# Patient Record
Sex: FEMALE | Race: WHITE
[De-identification: names, ages, dates, MRNs, and addresses within clinical notes are randomized per-mention and may not be internally consistent; named-entity substitution may affect disease eponyms.]

---

## 2019-06-13 ENCOUNTER — HOSPITAL ENCOUNTER (INPATIENT)
Dept: HOSPITAL 33 - ED | Age: 74
LOS: 5 days | Discharge: NURSING, REHAB OR LONG-TERM CARE | DRG: 389 | End: 2019-06-18
Attending: FAMILY MEDICINE | Admitting: FAMILY MEDICINE
Payer: MEDICARE

## 2019-06-13 DIAGNOSIS — R10.33: ICD-10-CM

## 2019-06-13 DIAGNOSIS — Z79.899: ICD-10-CM

## 2019-06-13 DIAGNOSIS — E78.00: ICD-10-CM

## 2019-06-13 DIAGNOSIS — R42: ICD-10-CM

## 2019-06-13 DIAGNOSIS — R00.1: ICD-10-CM

## 2019-06-13 DIAGNOSIS — R09.02: ICD-10-CM

## 2019-06-13 DIAGNOSIS — I10: ICD-10-CM

## 2019-06-13 DIAGNOSIS — K59.00: ICD-10-CM

## 2019-06-13 DIAGNOSIS — R73.9: ICD-10-CM

## 2019-06-13 DIAGNOSIS — R53.83: ICD-10-CM

## 2019-06-13 DIAGNOSIS — K21.9: ICD-10-CM

## 2019-06-13 DIAGNOSIS — R32: ICD-10-CM

## 2019-06-13 DIAGNOSIS — K57.30: ICD-10-CM

## 2019-06-13 DIAGNOSIS — K43.6: ICD-10-CM

## 2019-06-13 DIAGNOSIS — K56.609: Primary | ICD-10-CM

## 2019-06-13 LAB
ALBUMIN SERPL-MCNC: 4.2 G/DL (ref 3.5–5)
ALP SERPL-CCNC: 87 U/L (ref 38–126)
ALT SERPL-CCNC: 49 U/L (ref 0–35)
ANION GAP SERPL CALC-SCNC: 14.4 MEQ/L (ref 5–15)
AST SERPL QL: 32 U/L (ref 14–36)
BASOPHILS # BLD AUTO: 0.02 10*3/UL (ref 0–0.4)
BASOPHILS NFR BLD AUTO: 0.2 % (ref 0–0.4)
BILIRUB BLD-MCNC: 0.5 MG/DL (ref 0.2–1.3)
BUN SERPL-MCNC: 21 MG/DL (ref 7–17)
CALCIUM SPEC-MCNC: 10.3 MG/DL (ref 8.4–10.2)
CHLORIDE SERPL-SCNC: 104 MMOL/L (ref 98–107)
CO2 SERPL-SCNC: 27 MMOL/L (ref 22–30)
CREAT SERPL-MCNC: 1.57 MG/DL (ref 0.52–1.04)
EOSINOPHIL # BLD AUTO: 0.03 10*3/UL (ref 0–0.5)
GLUCOSE SERPL-MCNC: 154 MG/DL (ref 74–106)
GRANULOCYTES # BLD AUTO: 6.91 10*3/UL (ref 1.4–6.9)
HCT VFR BLD AUTO: 45.5 % (ref 35–47)
HGB BLD-MCNC: 15.2 GM/DL (ref 12–16)
LYMPHOCYTES # SPEC AUTO: 0.89 10*3/UL (ref 1–4.6)
MCH RBC QN AUTO: 30 PG (ref 26–32)
MCHC RBC AUTO-ENTMCNC: 33.4 G/DL (ref 32–36)
MONOCYTES # BLD AUTO: 0.44 10*3/UL (ref 0–1.3)
NEUTROPHILS NFR BLD AUTO: 83.4 % (ref 36–66)
PLATELET # BLD AUTO: 224 K/MM3 (ref 150–450)
POTASSIUM SERPLBLD-SCNC: 4.2 MMOL/L (ref 3.5–5.1)
PROT SERPL-MCNC: 7.2 G/DL (ref 6.3–8.2)
RBC # BLD AUTO: 5.06 M/MM3 (ref 4.1–5.4)
SODIUM SERPL-SCNC: 142 MMOL/L (ref 137–145)
WBC # BLD AUTO: 8.3 K/MM3 (ref 4–10.5)

## 2019-06-13 PROCEDURE — 83735 ASSAY OF MAGNESIUM: CPT

## 2019-06-13 PROCEDURE — 84484 ASSAY OF TROPONIN QUANT: CPT

## 2019-06-13 PROCEDURE — 84439 ASSAY OF FREE THYROXINE: CPT

## 2019-06-13 PROCEDURE — 36415 COLL VENOUS BLD VENIPUNCTURE: CPT

## 2019-06-13 PROCEDURE — 84481 FREE ASSAY (FT-3): CPT

## 2019-06-13 PROCEDURE — 96360 HYDRATION IV INFUSION INIT: CPT

## 2019-06-13 PROCEDURE — 80053 COMPREHEN METABOLIC PANEL: CPT

## 2019-06-13 PROCEDURE — 99285 EMERGENCY DEPT VISIT HI MDM: CPT

## 2019-06-13 PROCEDURE — 96374 THER/PROPH/DIAG INJ IV PUSH: CPT

## 2019-06-13 PROCEDURE — 84443 ASSAY THYROID STIM HORMONE: CPT

## 2019-06-13 PROCEDURE — 81001 URINALYSIS AUTO W/SCOPE: CPT

## 2019-06-13 PROCEDURE — 71046 X-RAY EXAM CHEST 2 VIEWS: CPT

## 2019-06-13 PROCEDURE — 85610 PROTHROMBIN TIME: CPT

## 2019-06-13 PROCEDURE — 83605 ASSAY OF LACTIC ACID: CPT

## 2019-06-13 PROCEDURE — 94762 N-INVAS EAR/PLS OXIMTRY CONT: CPT

## 2019-06-13 PROCEDURE — 97162 PT EVAL MOD COMPLEX 30 MIN: CPT

## 2019-06-13 PROCEDURE — 93005 ELECTROCARDIOGRAM TRACING: CPT

## 2019-06-13 PROCEDURE — 85025 COMPLETE CBC W/AUTO DIFF WBC: CPT

## 2019-06-13 PROCEDURE — 74176 CT ABD & PELVIS W/O CONTRAST: CPT

## 2019-06-14 LAB
GLUCOSE UR-MCNC: NEGATIVE MG/DL
PROT UR STRIP-MCNC: NEGATIVE MG/DL
RBC #/AREA URNS HPF: (no result) /HPF (ref 0–2)
WBC #/AREA URNS HPF: (no result) /HPF (ref 0–5)

## 2019-06-14 RX ADMIN — PANTOPRAZOLE SODIUM SCH MG: 40 INJECTION, POWDER, FOR SOLUTION INTRAVENOUS at 09:04

## 2019-06-14 RX ADMIN — ONDANSETRON PRN MG: 2 INJECTION, SOLUTION INTRAMUSCULAR; INTRAVENOUS at 16:28

## 2019-06-14 RX ADMIN — ONDANSETRON PRN MG: 2 INJECTION, SOLUTION INTRAMUSCULAR; INTRAVENOUS at 22:57

## 2019-06-14 NOTE — ERPHSYRPT
- History of Present Illness


Historian: patient


Exam Limitations: no limitations


Patient Subjective Stated Complaint: pt c/o nausea and vomiting since 6-7pm 

this evening after eating roast beef and potatoes for dinner. vomit x 2.


Triage Nursing Assessment: Pink/warm/dry, resp easy, a&ox4, gait not observed, 

pt has soft distension in abd in a hernia like fashion that pt states she "doesn

't know if it is a hernia, it has been there since my gallbladder surgery that 

they messed up but i feel like it has gotten bigger"


Physician History: 





Pt is a 72 y/o female that presented to the Ed with N/V, and abdominal 

discomfort.  Pt did have a BM in the AM prior to presentation, but she had many 

episodes of vomiting since.  Pt denies F/C/S.  No SOB or cough.  No dysuria, 

frequency and urgency.


Timing/Duration: today


Activities at Onset: none


Quality: aching


Abdominal Pain Onset Location: periumbilical


Pain Radiation: no radiation


Severity of Pain-Max: moderate


Severity of Pain-Current: mild


Modifying Factors: Improves With: nothing


Associated Symptoms: nausea, vomiting


Previous symptoms: no prior history


Allergies/Adverse Reactions: 








Sulfa (Sulfonamide Antibiotics) Allergy (Verified 06/13/19 22:42)


 


varicella-zoster immune globulin (h [varicella-zoster immune glob] Adverse 

Reaction (Intermediate, Verified 06/13/19 22:42)


 


 rash and sore bones 





Home Medications: 








Omeprazole 40 mg PO BID 01/20/15 [History]


Pravastatin Sodium 40 mg PO DAILY 01/20/15 [History]


Docusate Sodium 100 mg PO DAILY 06/13/19 [History]


Magnesium Oxide 400 mg PO DAILY 06/13/19 [History]


Solifenacin Succinate 10 mg PO DAILY 06/13/19 [History]


Spironolactone 25 mg PO DAILY 06/13/19 [History]





Hx Tetanus, Diphtheria Vaccination/Date Given: Yes


Hx Influenza Vaccination/Date Given: Yes


Hx Pneumococcal Vaccination/Date Given: Yes


Immunizations Up to Date: Yes





- Review of Systems


Constitutional: No Fever, No Chills


Eyes: No Symptoms


Ears, Nose, & Throat: No Symptoms


Respiratory: No Cough, No Dyspnea


Cardiac: No Chest Pain, No Edema, No Syncope


Abdominal/Gastrointestinal: Abdominal Pain, Nausea, Vomiting


Genitourinary Symptoms: No Dysuria


Musculoskeletal: No Back Pain, No Neck Pain


Neurological: No Dizziness, No Focal Weakness, No Sensory Changes





- Past Medical History


Pertinent Past Medical History: Yes


Neurological History: Migraines


ENT History: No Pertinent History


Cardiac History: High Cholesterol, Hypertension


Respiratory History: Bronchitis


Endocrine Medical History: No Pertinent History


Musculoskeletal History: No Pertinent History


GI Medical History: GERD, Gallbladder Disease


 History: Renal Disease


Psycho-Social History: No Pertinent History


Female Reproductive Disorders: Fibroids





- Past Surgical History


Past Surgical History: Yes


Neuro Surgical History: No Pertinent History


Cardiac: No Pertinent History


Respiratory: No Pertinent History


Gastrointestinal: Cholecystectomy


Genitourinary: No Pertinent History


Musculoskeletal: No Pertinent History


Female Surgical History: Hysterectomy





- Social History


Smoking Status: Never smoker


Exposure to second hand smoke: No


Drug Use: none


Patient Lives Alone: Yes





- Female History


Hx Pregnant Now: No





- Nursing Vital Signs


Nursing Vital Signs: 





 Initial Vital Signs











Temperature  97.9 F   06/13/19 22:42


 


Pulse Rate  68   06/13/19 22:42


 


Respiratory Rate  18   06/13/19 22:42


 


Blood Pressure  184/85   06/13/19 22:42


 


O2 Sat by Pulse Oximetry  93 L  06/13/19 22:42








 Pain Scale











Pain Intensity                 4

















- Physical Exam


General Appearance: mild distress


Eye Exam: PERRL/EOMI, eyes nml inspection


Ears, Nose, Throat Exam: normal ENT inspection, pharynx normal, moist mucous 

membranes


Neck Exam: normal inspection, non-tender, supple, full range of motion


Respiratory Exam: normal breath sounds, lungs clear, No respiratory distress


Cardiovascular Exam: regular rate/rhythm, normal heart sounds


Gastrointestinal/Abdomen Exam: soft, tenderness, hernia (ventral)


Back Exam: normal inspection, normal range of motion, No CVA tenderness, No 

vertebral tenderness


Extremity Exam: normal inspection, normal range of motion, pelvis stable


Neurologic Exam: alert, oriented x 3, cooperative, normal mood/affect, nml 

cerebellar function, sensation nml, No motor deficits


SpO2: 97





- CT Exams


  ** Abdomen/Pelvis


CT Interpretation: Tele-radiologist Report (proximal small bowel obstruction 

secondary to a ventral abdominal wall hernia containing a segment of dilated 

jejunum with small amount of adjacent fluid.  )


Ordered Tests: 





 Active Orders 24 hr











 Category Date Time Status


 


 ABDOMEN AND PELVIS W/0 CONTRAS [CT] Stat Exams  06/14/19 02:35 Taken


 


 CBC W DIFF Stat Lab  06/13/19 23:22 Completed


 


 CMP Stat Lab  06/13/19 23:22 Completed


 


 Urinalysis with Microscopy Stat Lab  06/13/19 23:40 Completed








Medication Summary














Discontinued Medications














Generic Name Dose Route Start Last Admin





  Trade Name Roberta  PRN Reason Stop Dose Admin


 


Sodium Chloride  1,000 mls @ 999 mls/hr  06/13/19 22:53  06/13/19 23:04





  Sodium Chloride 0.9% 1000 Ml  IV  06/13/19 23:53  999 mls/hr





  .Q1H1M STA   Administration





     





     





     





     


 


Sodium Chloride  Confirm  06/13/19 23:03  





  Sodium Chloride 0.9% 1000 Ml  Administered  06/13/19 23:04  





  Dose   





  1,000 mls @ ud   





  .ROUTE   





  .STK-MED ONE   





     





     





     





     


 


Ondansetron HCl  4 mg  06/13/19 22:53  06/13/19 23:04





  Zofran 4 Mg/2 Ml Vial**  IV  06/13/19 22:54  4 mg





  STAT ONE   Administration





     





     





     





     


 


Ondansetron HCl  Confirm  06/13/19 23:03  





  Zofran 4 Mg/2 Ml Vial**  Administered  06/13/19 23:04  





  Dose   





  4 mg   





  .ROUTE   





  .STK-MED ONE   





     





     





     





     











Lab/Rad Data: 





 Laboratory Result Diagrams





 06/13/19 23:22 





 06/13/19 23:22 





 Laboratory Results











  06/13/19 06/13/19 06/13/19 Range/Units





  23:40 23:22 23:22 


 


WBC    8.3  (4.0-10.5)  K/mm3


 


RBC    5.06  (4.1-5.4)  M/mm3


 


Hgb    15.2  (12.0-16.0)  gm/dl


 


Hct    45.5  (35-47)  %


 


MCV    89.9  ()  fl


 


MCH    30.0  (26-32)  pg


 


MCHC    33.4  (32-36)  g/dl


 


RDW    13.7  (11.5-14.0)  %


 


Plt Count    224  (150-450)  K/mm3


 


MPV    9.4  (6-9.5)  fl


 


Gran %    83.4 H  (36.0-66.0)  %


 


Eos # (Auto)    0.03  (0-0.5)  


 


Absolute Lymphs (auto)    0.89 L  (1.0-4.6)  


 


Absolute Monos (auto)    0.44  (0.0-1.3)  


 


Lymphocytes %    10.7 L  (24.0-44.0)  %


 


Monocytes %    5.3  (0.0-12.0)  %


 


Eosinophils %    0.4  (0.00-5.0)  %


 


Basophils %    0.2  (0.0-0.4)  %


 


Absolute Granulocytes    6.91 H  (1.4-6.9)  


 


Basophils #    0.02  (0-0.4)  


 


Sodium   142   (137-145)  mmol/L


 


Potassium   4.2   (3.5-5.1)  mmol/L


 


Chloride   104   ()  mmol/L


 


Carbon Dioxide   27   (22-30)  mmol/L


 


Anion Gap   14.4   (5-15)  MEQ/L


 


BUN   21 H   (7-17)  mg/dL


 


Creatinine   1.57 H   (0.52-1.04)  mg/dL


 


Estimated GFR   34.4   ML/MIN


 


Glucose   154 H   ()  mg/dL


 


Calcium   10.3 H   (8.4-10.2)  mg/dL


 


Total Bilirubin   0.50   (0.2-1.3)  mg/dL


 


AST   32   (14-36)  U/L


 


ALT   49 H   (0-35)  U/L


 


Alkaline Phosphatase   87   ()  U/L


 


Serum Total Protein   7.2   (6.3-8.2)  g/dL


 


Albumin   4.2   (3.5-5.0)  g/dL


 


Urine Color  YELLOW    (YELLOW)  


 


Urine Appearance  CLEAR    (CLEAR)  


 


Urine pH  8.0    (5-6)  


 


Ur Specific Gravity  1.011    (1.005-1.025)  


 


Urine Protein  NEGATIVE    (Negative)  


 


Urine Ketones  NEGATIVE    (NEGATIVE)  


 


Urine Blood  NEGATIVE    (0-5)  Raffy/ul


 


Urine Nitrite  NEGATIVE    (NEGATIVE)  


 


Urine Bilirubin  NEGATIVE    (NEGATIVE)  


 


Urine Urobilinogen  NEGATIVE    (0-1)  mg/dL


 


Ur Leukocyte Esterase  NEGATIVE    (NEGATIVE)  


 


Urine WBC (Auto)  NONE    (0-5)  /HPF


 


Urine RBC (Auto)  0-2    (0-2)  /HPF


 


U Epithel Cells (Auto)  RARE    (FEW)  /HPF


 


Urine Bacteria (Auto)  NONE    (NEGATIVE)  /HPF


 


Urine Glucose  NEGATIVE    (NEGATIVE)  mg/dL














- Progress


Progress: improved


Progress Note: 





06/14/19 04:25


Pt was seen and examined.  Labs were done and the only abnormality was elevated 

sCr, that is secondary to CKD.  No leukocytosis.  Secondary to pt's discomfort 

a CT was done, that showed SBO.  Dr Rubio was contacted, and he asked for NG 

placement and he will see the pt in consult.  Dr Erickson accepted the pt for 

admission.


Will see patient in: hospital (full admit)


Counseled pt/family regarding: lab results, diagnosis, rad results





- Departure


Departure Disposition: In-patient Admission


Clinical Impression: 


 SBO (small bowel obstruction)





Condition: Stable


Critical Care Time: No


Referrals: 


NIKKI RIZZO [Primary Care Provider] - 


Additional Instructions: 


Pt will be admitted by Dr Erickson.  Dr Rubio will be consulted.  NG tube will 

be placed.

## 2019-06-14 NOTE — XRAY
Indication: Nausea and vomiting.



Multiple contiguous axial images obtained through the abdomen and pelvis

without contrast as ordered.



Comparison: None



Lung bases demonstrates a few calcified granulomas and minimal

fibrosis/scarring.  No infiltrate or effusion.  Heart is not enlarged.  Small

fluid-filled hiatal hernia.



Stomach is moderately fluid distended.  There is a moderate-sized midline

supraumbilical ventral hernia at least 5 cm in diameter with herniated jejunal

bowel loop and transverse colon.  Herniated small bowel loop is moderately

fluid distended up to 3.8 cm as is the small bowel loop proximal to the hernia

concerning for partial obstruction.  Ventral hernia also demonstrates tiny

free fluid.  No walled off fluid collection or free air.  Smaller fatty

midline ventral hernia seen just inferior to this.



Diffuse scattered colonic diverticulosis.  Previous cholecystectomy and

hysterectomy.  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  Mild diffuse fatty liver.

Remaining liver, pancreas, spleen, adrenal glands, left kidney, ureters, and

bladder appear unremarkable for noncontrast exam.  Mild scattered aortoiliac

calcifications without AAA.



Osseous structures intact with mild degenerative changes throughout the

thoracolumbar spine.



Impression:

1.  Midline supraumbilical ventral hernia with herniated bowel loops and

subsequent partial obstruction as detailed.  Additional smaller fatty ventral

hernia.

2.  Incidental diffuse colonic diverticulosis, small hiatal hernia, fatty

liver, right renal angiomyolipoma, and evidence for old granulomatous disease.



Comment: Preliminary interpretation was made by [facility].  No critical discrepancy.



CTDI 22.67

## 2019-06-14 NOTE — HP
ADMITTED:    06/14/2019



CHIEF COMPLAINT:  Abdominal pain, nausea, and vomiting.



HISTORY OF PRESENT ILLNESS:  The patient is a 72 y/o WF who presents for the above 
complaints. She reports that she had been fine until she had her evening meal after which 
she became distended and felt terrible. Began having abdominal pain and nausea and 
vomiting. The patient presented to the ER and was found to have small bowel obstruction.



PAST MEDICAL HISTORY:  Significant for a cholecystectomy. Apparently, during the surgery, 
they compromised the artery and she needed to be opened up to be repaired. The patient now 
has a large incisional hernia which apparently there are small bowel loops in. The patient 
otherwise reports renal failure for which she sees a nephrologist. She has otherwise 
issues with hypertension, hyperlipidemia, gastroesophageal reflux disease. She has had a 
hysterectomy.



HOME MEDICATIONS: She has home medications of omeprazole 40 mg q d, pravastatin 40 mg q d, 
docusate sodium, magnesium, solifenacin 10 mg daily, and spironolactone 25 mg q d.



ALLERGIES:  HER STATED ALLERGIES ARE TO SULFA AND VARICELLA GLOBULIN REACTION.



PHYSICAL EXAMINATION:  The patient's vital signs on admission showed a temperature of 
97.9, pulse 68, respiratory rate 18, /85 with an O2 saturation of 93%.

HEENT:  Normocephalic and atraumatic. Pupils equal, round, and reactive to light. EOM 
intact. Oropharynx was pink and moist.

NECK:  Patient currently has an NG tube placed to gravity.

CHEST:  Clear to auscultation.

HEART:  Regular rate and rhythm.

ABDOMEN:  Shows a large hernia. It is soft. No palpable masses otherwise are felt.

EXTREMITIES:  Without cyanosis, clubbing, or edema.

NEURO:  The patient is alert and oriented X 3.



LABORATORY STUDIES:  Showed a UA which was normal. Metabolic panel shows sugar 154, BUN 
21, creatinine 1.57. Electrolytes are normal. Liver enzymes are essentially normal as well 
other than slightly elevated SGPT of 49. CBC is normal with a Hgb of 15.2 and platelet 
count of 224,000. CT scan shows small bowel obstruction secondary to ventral abdominal 
hernia containing a segment of dilated jejunum with small amount of adjacent fluid. Hernia 
sac also contains transverse colon.



ASSESSMENT:

1.  PATIENT HAS SMALL BOWEL OBSTRUCTION DUE TO THE HERNIA. NG tube has been placed. She is 
on IV fluids and she will have a surgical consultation. We have held her medication and 
placed her on IV Protonix 40 mg daily.

## 2019-06-15 LAB
ALBUMIN SERPL-MCNC: 3.8 G/DL (ref 3.5–5)
ALP SERPL-CCNC: 77 U/L (ref 38–126)
ALT SERPL-CCNC: 38 U/L (ref 0–35)
ANION GAP SERPL CALC-SCNC: 14 MEQ/L (ref 5–15)
AST SERPL QL: 25 U/L (ref 14–36)
BASOPHILS # BLD AUTO: 0.01 10*3/UL (ref 0–0.4)
BASOPHILS NFR BLD AUTO: 0.1 % (ref 0–0.4)
BILIRUB BLD-MCNC: 0.6 MG/DL (ref 0.2–1.3)
BUN SERPL-MCNC: 19 MG/DL (ref 7–17)
CALCIUM SPEC-MCNC: 9.1 MG/DL (ref 8.4–10.2)
CHLORIDE SERPL-SCNC: 108 MMOL/L (ref 98–107)
CO2 SERPL-SCNC: 24 MMOL/L (ref 22–30)
CREAT SERPL-MCNC: 1.21 MG/DL (ref 0.52–1.04)
EOSINOPHIL # BLD AUTO: 0.01 10*3/UL (ref 0–0.5)
GLUCOSE SERPL-MCNC: 107 MG/DL (ref 74–106)
GRANULOCYTES # BLD AUTO: 6.17 10*3/UL (ref 1.4–6.9)
HCT VFR BLD AUTO: 43.6 % (ref 35–47)
HGB BLD-MCNC: 14.2 GM/DL (ref 12–16)
INR PPP: 1.12 (ref 0.8–3)
LYMPHOCYTES # SPEC AUTO: 1.11 10*3/UL (ref 1–4.6)
MCH RBC QN AUTO: 29.7 PG (ref 26–32)
MCHC RBC AUTO-ENTMCNC: 32.6 G/DL (ref 32–36)
MONOCYTES # BLD AUTO: 0.64 10*3/UL (ref 0–1.3)
NEUTROPHILS NFR BLD AUTO: 77.7 % (ref 36–66)
PLATELET # BLD AUTO: 209 K/MM3 (ref 150–450)
POTASSIUM SERPLBLD-SCNC: 3.8 MMOL/L (ref 3.5–5.1)
PROT SERPL-MCNC: 6.5 G/DL (ref 6.3–8.2)
PROTHROMBIN TIME: 12.7 SECONDS (ref 9.95–12.35)
RBC # BLD AUTO: 4.78 M/MM3 (ref 4.1–5.4)
SODIUM SERPL-SCNC: 142 MMOL/L (ref 137–145)
WBC # BLD AUTO: 7.9 K/MM3 (ref 4–10.5)

## 2019-06-15 RX ADMIN — PANTOPRAZOLE SODIUM SCH MG: 40 INJECTION, POWDER, FOR SOLUTION INTRAVENOUS at 10:55

## 2019-06-15 NOTE — PCM.HP
History of Present Illness





- Chief Complaint


Chief Complaint: abdominal kandy


History of Present Illness: 


 is a 73 year old female


with HTN,Hypercholesterolemia and GERD who presented to the ER with abdominal 

pain vomiting and diarrhea which started after eating evening meal roast and 

potatoes.She is S/P cholecystectomy and hysterectomy for fibroids and has an 

upper abdominal incisional hernia.She denies fever.See ER work up and General 

Surgery consult. NGT placed.





- Review of Systems


Constitutional: No Fever, No Chills


Eyes: No Symptoms


Ears, Nose, & Throat: No Symptoms


Respiratory: No Cough, No Short Of Breath


Cardiac: Other (, She takes Spironalactone for HTN and a cholesterol med), No 

Chest Pain, No Edema, No Syncope


Abdominal/Gastrointestinal: Other (see HPI)


Genitourinary Symptoms: Other (is on Vesicare for urine incontinence)





Medications & Allergies


Home Medications: 


 Home Medication List





Omeprazole 40 mg PO BID 01/20/15 [History Confirmed 06/13/19]


Pravastatin Sodium 40 mg PO DAILY 01/20/15 [History Confirmed 06/13/19]


Docusate Sodium 100 mg PO DAILY 06/13/19 [History Confirmed 06/13/19]


Magnesium Oxide 400 mg PO DAILY 06/13/19 [History Confirmed 06/13/19]


Solifenacin Succinate 10 mg PO DAILY 06/13/19 [History Confirmed 06/13/19]


Spironolactone 25 mg PO DAILY 06/13/19 [History Confirmed 06/13/19]


Ergocalciferol (Vitamin D2) [Vitamin D2] 50,000 unit PO Q7D 06/14/19 [History 

Confirmed 06/14/19]








Allergies/Adverse Reactions: 


 Allergies











Allergy/AdvReac Type Severity Reaction Status Date / Time


 


Sulfa (Sulfonamide Allergy   Verified 06/13/19 22:42





Antibiotics)     


 


varicella-zoster immune AdvReac Intermediate  Verified 06/13/19 22:42





globulin (h     





[varicella-zoster immune     





glob]     














- Past Medical History


Past Medical History: Yes


Neurological History: Migraines


ENT History: No Pertinent History


Cardiac History: High Cholesterol, Hypertension


Respiratory History: Bronchitis, Other (nonsmoker)


Endocrine Medical History: No Pertinent History


Musculoskelatal History: No Pertinent History


GI Medical History: GERD, Gallbladder Disease


 History: Renal Disease


Pyscho-Social History: No Pertinent History


Reproductive Disorders: Fibroids, Other (S/P hysterectomy)





- Female History


Are you pregnant now?: No





- Past Surgical History


Past Surgical History: Yes


Neuro Surgical History: No Pertinent History


Cardiac History: No Pertinent History


Respiratory Surgery: No Pertinent History


GI Surgical History: Cholecystectomy


Genitourinary Surgical Hx: No Pertinent History


Musculskeletal Surgical Hx: No Pertinent History


Female Surgical History: Hysterectomy





- Social History


Smoking Status: Never smoker


Exposure to second hand smoke: Yes


Alcohol: None


Drug Use: none





- Physical Exam


Vital Signs: 


 Vital Signs - 24 hr











  Temp Pulse Resp BP Pulse Ox


 


 06/15/19 19:53  97.4 F  56 L  18  135/63  95


 


 06/15/19 16:00  97.8 F  54 L  18  131/57  91 L


 


 06/15/19 11:59  97.8 F  55 L  18  118/66  88 L


 


 06/15/19 06:54  98.2 F  52 L  20  124/60  90 L


 


 06/15/19 04:00  98.2 F  63  18  140/65  90 L


 


 06/14/19 23:37  97.8 F  79  18  146/63  91 L











General Appearance: mild distress, other (abdominal discomfort)


Neurologic Exam: alert, oriented x 3, cooperative, CNs II-XII nml as tested


Eye Exam: PERRL/EOMI


Ears, Nose, Throat Exam: normal ENT inspection


Neck Exam: normal inspection


Respiratory Exam: normal breath sounds


Cardiovascular Exam: other (regular,rate 55)


Gastrointestinal/Abdomen Exam: distention, hernia, other (upper abdominal 

hernia tender)


Pelvic Exam: not done


Rectal Exam: deferred, not done


Back Exam: other (no CVA tenderness)


Extremity Exam: normal inspection, normal range of motion, other (trace ankle 

edema)


Skin Exam: normal color, warm, dry, No rash





Results





- Labs


Lab/Micro Results: 


 Lab Results-Last 24 Hours











  06/15/19 06/15/19 06/15/19 Range/Units





  05:50 05:50 05:50 


 


WBC  7.9    (4.0-10.5)  K/mm3


 


RBC  4.78    (4.1-5.4)  M/mm3


 


Hgb  14.2    (12.0-16.0)  gm/dl


 


Hct  43.6    (35-47)  %


 


MCV  91.2    ()  fl


 


MCH  29.7    (26-32)  pg


 


MCHC  32.6    (32-36)  g/dl


 


RDW  13.9    (11.5-14.0)  %


 


Plt Count  209    (150-450)  K/mm3


 


MPV  9.0    (6-9.5)  fl


 


Gran %  77.7 H    (36.0-66.0)  %


 


Eos # (Auto)  0.01    (0-0.5)  


 


Absolute Lymphs (auto)  1.11    (1.0-4.6)  


 


Absolute Monos (auto)  0.64    (0.0-1.3)  


 


Lymphocytes %  14.0 L    (24.0-44.0)  %


 


Monocytes %  8.1    (0.0-12.0)  %


 


Eosinophils %  0.1    (0.00-5.0)  %


 


Basophils %  0.1    (0.0-0.4)  %


 


Absolute Granulocytes  6.17    (1.4-6.9)  


 


Basophils #  0.01    (0-0.4)  


 


PT    12.7 H  (9.95-12.35)  SECONDS


 


INR    1.12  (0.8-3.0)  


 


Sodium   142   (137-145)  mmol/L


 


Potassium   3.8   (3.5-5.1)  mmol/L


 


Chloride   108 H   ()  mmol/L


 


Carbon Dioxide   24   (22-30)  mmol/L


 


Anion Gap   14.0   (5-15)  MEQ/L


 


BUN   19 H   (7-17)  mg/dL


 


Creatinine   1.21 H   (0.52-1.04)  mg/dL


 


Estimated GFR   46.5   ML/MIN


 


Glucose   107 H   ()  mg/dL


 


Lactic Acid     (0.4-2.0)  


 


Calcium   9.1   (8.4-10.2)  mg/dL


 


Total Bilirubin   0.60   (0.2-1.3)  mg/dL


 


AST   25   (14-36)  U/L


 


ALT   38 H   (0-35)  U/L


 


Alkaline Phosphatase   77   ()  U/L


 


Serum Total Protein   6.5   (6.3-8.2)  g/dL


 


Albumin   3.8   (3.5-5.0)  g/dL














  06/15/19 Range/Units





  06:00 


 


WBC   (4.0-10.5)  K/mm3


 


RBC   (4.1-5.4)  M/mm3


 


Hgb   (12.0-16.0)  gm/dl


 


Hct   (35-47)  %


 


MCV   ()  fl


 


MCH   (26-32)  pg


 


MCHC   (32-36)  g/dl


 


RDW   (11.5-14.0)  %


 


Plt Count   (150-450)  K/mm3


 


MPV   (6-9.5)  fl


 


Gran %   (36.0-66.0)  %


 


Eos # (Auto)   (0-0.5)  


 


Absolute Lymphs (auto)   (1.0-4.6)  


 


Absolute Monos (auto)   (0.0-1.3)  


 


Lymphocytes %   (24.0-44.0)  %


 


Monocytes %   (0.0-12.0)  %


 


Eosinophils %   (0.00-5.0)  %


 


Basophils %   (0.0-0.4)  %


 


Absolute Granulocytes   (1.4-6.9)  


 


Basophils #   (0-0.4)  


 


PT   (9.95-12.35)  SECONDS


 


INR   (0.8-3.0)  


 


Sodium   (137-145)  mmol/L


 


Potassium   (3.5-5.1)  mmol/L


 


Chloride   ()  mmol/L


 


Carbon Dioxide   (22-30)  mmol/L


 


Anion Gap   (5-15)  MEQ/L


 


BUN   (7-17)  mg/dL


 


Creatinine   (0.52-1.04)  mg/dL


 


Estimated GFR   ML/MIN


 


Glucose   ()  mg/dL


 


Lactic Acid  1.1  (0.4-2.0)  


 


Calcium   (8.4-10.2)  mg/dL


 


Total Bilirubin   (0.2-1.3)  mg/dL


 


AST   (14-36)  U/L


 


ALT   (0-35)  U/L


 


Alkaline Phosphatase   ()  U/L


 


Serum Total Protein   (6.3-8.2)  g/dL


 


Albumin   (3.5-5.0)  g/dL














- Radiology Impressions


Radiology Exams & Impressions: 


 Radiology Procedures











 Category Date Time Status


 


 ABDOMEN AND PELVIS W/0 CONTRAS [CT] Stat Exams  06/14/19 02:35 Completed














Assessment/Plan


(1) SBO (small bowel obstruction)


Current Visit: Yes   Status: Acute   


Assessment & Plan: 


orders per General Surgeon Dr Rob Rubio,feels with bowel rest it should 

resolve.


Code(s): K56.609 - UNSP INTESTNL OBST, UNSP AS TO PARTIAL VERSUS COMPLETE OBST 

  





(2) Hypertension


Current Visit: Yes   Status: Acute   


Qualifiers: 


   Hypertension type: essential hypertension   Qualified Code(s): I10 - 

Essential (primary) hypertension   


Assessment & Plan: 


is receiving IV NS and is off oral meds. One time dose IV Lasix,moniter.


Code(s): I10 - ESSENTIAL (PRIMARY) HYPERTENSION

## 2019-06-16 LAB
ALBUMIN SERPL-MCNC: 3.5 G/DL (ref 3.5–5)
ALP SERPL-CCNC: 72 U/L (ref 38–126)
ALT SERPL-CCNC: 33 U/L (ref 0–35)
ANION GAP SERPL CALC-SCNC: 15.6 MEQ/L (ref 5–15)
AST SERPL QL: 25 U/L (ref 14–36)
BASOPHILS # BLD AUTO: 0.02 10*3/UL (ref 0–0.4)
BASOPHILS NFR BLD AUTO: 0.4 % (ref 0–0.4)
BILIRUB BLD-MCNC: 0.9 MG/DL (ref 0.2–1.3)
BUN SERPL-MCNC: 19 MG/DL (ref 7–17)
CALCIUM SPEC-MCNC: 9.1 MG/DL (ref 8.4–10.2)
CHLORIDE SERPL-SCNC: 108 MMOL/L (ref 98–107)
CO2 SERPL-SCNC: 23 MMOL/L (ref 22–30)
CREAT SERPL-MCNC: 1.21 MG/DL (ref 0.52–1.04)
EOSINOPHIL # BLD AUTO: 0.13 10*3/UL (ref 0–0.5)
GLUCOSE SERPL-MCNC: 90 MG/DL (ref 74–106)
GRANULOCYTES # BLD AUTO: 3.25 10*3/UL (ref 1.4–6.9)
HCT VFR BLD AUTO: 40.7 % (ref 35–47)
HGB BLD-MCNC: 13.4 GM/DL (ref 12–16)
LYMPHOCYTES # SPEC AUTO: 1.45 10*3/UL (ref 1–4.6)
MAGNESIUM SERPL-MCNC: 1.9 MG/DL (ref 1.6–2.3)
MCH RBC QN AUTO: 29.9 PG (ref 26–32)
MCHC RBC AUTO-ENTMCNC: 32.9 G/DL (ref 32–36)
MONOCYTES # BLD AUTO: 0.61 10*3/UL (ref 0–1.3)
NEUTROPHILS NFR BLD AUTO: 59.4 % (ref 36–66)
PLATELET # BLD AUTO: 178 K/MM3 (ref 150–450)
POTASSIUM SERPLBLD-SCNC: 3.6 MMOL/L (ref 3.5–5.1)
PROT SERPL-MCNC: 6.2 G/DL (ref 6.3–8.2)
RBC # BLD AUTO: 4.48 M/MM3 (ref 4.1–5.4)
SODIUM SERPL-SCNC: 143 MMOL/L (ref 137–145)
T3FREE SERPL-MCNC: 2.97 PG/ML (ref 2.77–5.27)
WBC # BLD AUTO: 5.5 K/MM3 (ref 4–10.5)

## 2019-06-16 RX ADMIN — PANTOPRAZOLE SODIUM SCH: 40 INJECTION, POWDER, FOR SOLUTION INTRAVENOUS at 11:03

## 2019-06-16 RX ADMIN — SENNOSIDES SCH MG: 8.6 TABLET, FILM COATED ORAL at 16:29

## 2019-06-16 RX ADMIN — PANTOPRAZOLE SODIUM SCH MG: 40 TABLET, DELAYED RELEASE ORAL at 16:29

## 2019-06-16 RX ADMIN — MAGNESIUM OXIDE TAB 400 MG (241.3 MG ELEMENTAL MG) SCH MG: 400 (241.3 MG) TAB at 16:28

## 2019-06-16 RX ADMIN — SENNOSIDES SCH MG: 8.6 TABLET, FILM COATED ORAL at 21:31

## 2019-06-16 NOTE — XRAY
Indication: Short of breath.  Hypertension.



Comparison: April 3, 2017.



PA/lateral chest obtained.  PA view degraded by respiration artifact.  Lungs

hyperinflated and clear again with a few incidental calcified granulomas.

Heart and mediastinal structures within normal limits.  Bony thorax intact

again with mild osteopenia and degenerative changes.



Impression: Respiration artifact.  Grossly stable nonacute chest again with

chronic features.



Comment: Preliminary interpretation was made by [facility].  No critical discrepancy.

## 2019-06-16 NOTE — PCM.NOTE
Date and Time: 06/16/19 1856





Subjective Assessment: 





Patient has improved with SBO symtoms. Dr Rubio had ordered her NGT removed 

yesterday and she started eating ice chips and has advanced to full liquids and 

is ready to try a regular diet. She has had small amount of stool passed today 

and has been up with nursing staff walking short distances. No N/V or abdominal 

pain today.





- Review of Systems


Cardiac: Other (patient states she has been to;d she has a low heart rate in 

the past but has not had testing or seen a Cardiologist.She atate when she goes 

to the grocery lately she becomes very tired.Denies chest pain or palpitations.)





Objective Exam


General Appearance: no apparent distress, alert


Neurologic Exam: alert, oriented x 3, normal mood/affect


Respiratory Exam: diminished breath sounds (fine eew left mid improvedwith deep 

breathing and cough)


Cardiovascular Exam: other (regular with rate 57, Note heart rate per vitals 

page was 47 through the night and days staying in the mid 50s)


Gastrointestinal/Abdomen Exam: soft, normal bowel sounds, hernia





OBJECTIVE DATA


Vital Signs: 


 Vital Signs - 24 hr











  Temp Pulse Resp BP Pulse Ox


 


 06/16/19 16:00  97.5 F  57 L  17  152/63  93 L


 


 06/16/19 12:00  97.6 F  53 L  18  172/72  95


 


 06/16/19 07:28  98.0 F  46 L  18  115/60  94 L


 


 06/16/19 04:00  97.6 F  50 L  18  136/64  94 L


 


 06/15/19 23:46  97.6 F  54 L  18  125/59  96


 


 06/15/19 19:53  97.4 F  56 L  18  135/63  95








 Pain Assessment - Last Documented











Pain Intensity                 5


 


Pain Scale Used                0-10 Pain Scale











Intake and Output: 


 Intake & Output











 06/14/19 06/15/19 06/16/19 06/17/19





 11:59 11:59 11:59 11:59


 


Intake Total  1174 2480 600


 


Output Total  2030 450 


 


Balance  -856 2030 600


 


Weight 91.5 kg   











Lab Results: 


 Lab Results-Last 24 Hours











  06/16/19 06/16/19 Range/Units





  05:35 05:35 


 


WBC  5.5   (4.0-10.5)  K/mm3


 


RBC  4.48   (4.1-5.4)  M/mm3


 


Hgb  13.4   (12.0-16.0)  gm/dl


 


Hct  40.7   (35-47)  %


 


MCV  90.8   ()  fl


 


MCH  29.9   (26-32)  pg


 


MCHC  32.9   (32-36)  g/dl


 


RDW  13.7   (11.5-14.0)  %


 


Plt Count  178   (150-450)  K/mm3


 


MPV  9.6 H   (6-9.5)  fl


 


Gran %  59.4   (36.0-66.0)  %


 


Eos # (Auto)  0.13   (0-0.5)  


 


Absolute Lymphs (auto)  1.45   (1.0-4.6)  


 


Absolute Monos (auto)  0.61   (0.0-1.3)  


 


Lymphocytes %  26.6   (24.0-44.0)  %


 


Monocytes %  11.2   (0.0-12.0)  %


 


Eosinophils %  2.4   (0.00-5.0)  %


 


Basophils %  0.4   (0.0-0.4)  %


 


Absolute Granulocytes  3.25   (1.4-6.9)  


 


Basophils #  0.02   (0-0.4)  


 


Sodium   143  (137-145)  mmol/L


 


Potassium   3.6  (3.5-5.1)  mmol/L


 


Chloride   108 H  ()  mmol/L


 


Carbon Dioxide   23  (22-30)  mmol/L


 


Anion Gap   15.6 H  (5-15)  MEQ/L


 


BUN   19 H  (7-17)  mg/dL


 


Creatinine   1.21 H  (0.52-1.04)  mg/dL


 


Estimated GFR   46.5  ML/MIN


 


Glucose   90  ()  mg/dL


 


Calcium   9.1  (8.4-10.2)  mg/dL


 


Total Bilirubin   0.90  (0.2-1.3)  mg/dL


 


AST   25  (14-36)  U/L


 


ALT   33  (0-35)  U/L


 


Alkaline Phosphatase   72  ()  U/L


 


Serum Total Protein   6.2 L  (6.3-8.2)  g/dL


 


Albumin   3.5  (3.5-5.0)  g/dL











Radiology Exams: 


 Radiology Procedures











 Category Date Time Status


 


 CHEST 2 VIEWS (PA AND LAT) Routine Exams  06/16/19 15:37 Taken














Assessment/Plan


(1) SBO (small bowel obstruction)


Current Visit: Yes   Status: Acute   


Assessment & Plan: 


is resolving,Dr Rubio Gen Surgeon will see her in 2 weeks.


Code(s): K56.609 - UNSP INTESTNL OBST, UNSP AS TO PARTIAL VERSUS COMPLETE OBST 

  





(2) Bradycardia


Current Visit: Yes   Status: Acute   


Assessment & Plan: 


EKG,telemetry,CXR,oernight ox and with ambulation


Code(s): R00.1 - BRADYCARDIA, UNSPECIFIED   





(3) Physical deconditioning


Current Visit: Yes   Status: Acute   


Assessment & Plan: 


will need  PT to eval and treat.


Code(s): R53.81 - OTHER MALAISE

## 2019-06-17 RX ADMIN — SENNOSIDES SCH MG: 8.6 TABLET, FILM COATED ORAL at 09:29

## 2019-06-17 RX ADMIN — SENNOSIDES SCH MG: 8.6 TABLET, FILM COATED ORAL at 21:36

## 2019-06-17 RX ADMIN — MAGNESIUM OXIDE TAB 400 MG (241.3 MG ELEMENTAL MG) SCH MG: 400 (241.3 MG) TAB at 09:29

## 2019-06-17 RX ADMIN — POLYETHYLENE GLYCOL 3350 SCH GM: 17 POWDER, FOR SOLUTION ORAL at 09:29

## 2019-06-17 RX ADMIN — PANTOPRAZOLE SODIUM SCH MG: 40 TABLET, DELAYED RELEASE ORAL at 09:29

## 2019-06-17 NOTE — PCM.NOTE
Date and Time: 06/17/19 0902





Subjective Assessment: 





Pt is genevieve regular diet.  Has had several small amount of stool pass.  Abdominal 

discomfort is nearly gone.





- Review of Systems


Constitutional: No Fever


Abdominal/Gastrointestinal: No Vomiting





Objective Exam


General Appearance: no apparent distress, alert


Neurologic Exam: oriented x 3, cooperative


Skin Exam: normal color, warm, dry, No rash


Respiratory Exam: normal breath sounds, lungs clear, No crackles/rales, No 

rhonchi, No wheezing


Cardiovascular Exam: regular rate/rhythm, normal heart sounds, No murmur


Gastrointestinal/Abdomen Exam: soft, normal bowel sounds, other (midline is 

well healed scar with some palpable hernia which is reducible), No tenderness, 

No guarding


Extremity Exam: normal inspection, No pedal edema, No swelling


Back Exam: normal inspection, No rash





OBJECTIVE DATA


Vital Signs: 


 Vital Signs - 24 hr











  Temp Pulse Resp BP Pulse Ox


 


 06/17/19 07:26  97.6 F  46 L  16  136/63  92 L


 


 06/17/19 03:53  97.9 F  50 L  18  121/58  95


 


 06/17/19 00:00  98.1 F  52 L  18  137/65  94 L


 


 06/16/19 23:10  98.1 F  52 L  48 H  137/65  94 L


 


 06/16/19 21:41      95


 


 06/16/19 19:31  98.0 F  65  20  123/57  94 L


 


 06/16/19 16:00  97.5 F  57 L  17  152/63  93 L


 


 06/16/19 12:00  97.6 F  53 L  18  172/72  95








 Pain Assessment - Last Documented











Pain Intensity                 0


 


Pain Scale Used                0-10 Pain Scale











Intake and Output: 


 Intake & Output











 06/14/19 06/15/19 06/16/19 06/17/19





 11:59 11:59 11:59 11:59


 


Intake Total  1174 2480 2520


 


Output Total  2030 450 


 


Balance  -856 2030 2520


 


Weight 91.5 kg   











Lab Results: 


 Lab Results-Last 24 Hours











  06/16/19 06/16/19 06/16/19 Range/Units





  19:38 19:38 19:38 


 


Magnesium  1.9    (1.6-2.3)  mg/dL


 


Troponin I   < 0.012   (0.000-0.034)  ng/mL


 


Free T4    1.01  (0.76-1.46)  ng/dL


 


Free T3 pg/mL  2.97    (2.77-5.27)  pg/mL


 


TSH 3rd Generation  3.210    (0.47-4.68)  mIU/L











Radiology Exams: 


 Radiology Procedures











 Category Date Time Status


 


 CHEST 2 VIEWS (PA AND LAT) Routine Exams  06/16/19 15:37 Completed














Assessment/Plan


(1) Physical deconditioning


Current Visit: Yes   Status: Acute   


Assessment & Plan: 


exacerbated by this hospital stay.  Would like pt to stay in swing bed for rehab

, likely starting tomorrow.  Consulted PT today.


Code(s): R53.81 - OTHER MALAISE   





(2) SBO (small bowel obstruction)


Current Visit: Yes   Status: Acute   


Assessment & Plan: 


Much improved, genevieve po, just small amount abd discomfort remains.


Code(s): K56.609 - UNSP INTESTNL OBST, UNSP AS TO PARTIAL VERSUS COMPLETE OBST 

  





(3) Bradycardia


Current Visit: Yes   Status: Acute   


Assessment & Plan: 


some HR into the 40s which may be contributing to her fatigue.  Cardiology 

consulted.


Code(s): R00.1 - BRADYCARDIA, UNSPECIFIED   





(4) Hypertension


Current Visit: Yes   Status: Chronic   


Qualifiers: 


   Hypertension type: essential hypertension   Qualified Code(s): I10 - 

Essential (primary) hypertension   


Code(s): I10 - ESSENTIAL (PRIMARY) HYPERTENSION   





(5) Constipation


Current Visit: Yes   Status: Acute   


Qualifiers: 


   Constipation type: slow transit constipation   Qualified Code(s): K59.01 - 

Slow transit constipation   


Assessment & Plan: 


will add miralax to senna.


Code(s): K59.00 - CONSTIPATION, UNSPECIFIED

## 2019-06-18 ENCOUNTER — HOSPITAL ENCOUNTER (INPATIENT)
Dept: HOSPITAL 33 - MED SURG | Age: 74
LOS: 8 days | Discharge: HOME | DRG: 948 | End: 2019-06-26
Attending: FAMILY MEDICINE | Admitting: FAMILY MEDICINE
Payer: MEDICARE

## 2019-06-18 VITALS — SYSTOLIC BLOOD PRESSURE: 124 MMHG | HEART RATE: 54 BPM | DIASTOLIC BLOOD PRESSURE: 72 MMHG | OXYGEN SATURATION: 94 %

## 2019-06-18 DIAGNOSIS — I10: ICD-10-CM

## 2019-06-18 DIAGNOSIS — R42: ICD-10-CM

## 2019-06-18 DIAGNOSIS — R53.81: Primary | ICD-10-CM

## 2019-06-18 DIAGNOSIS — E78.00: ICD-10-CM

## 2019-06-18 DIAGNOSIS — Z79.899: ICD-10-CM

## 2019-06-18 DIAGNOSIS — Z87.19: ICD-10-CM

## 2019-06-18 DIAGNOSIS — R00.1: ICD-10-CM

## 2019-06-18 PROCEDURE — 93225 XTRNL ECG REC<48 HRS REC: CPT

## 2019-06-18 PROCEDURE — 93226 XTRNL ECG REC<48 HR SCAN A/R: CPT

## 2019-06-18 RX ADMIN — SENNOSIDES SCH MG: 8.6 TABLET, FILM COATED ORAL at 09:22

## 2019-06-18 RX ADMIN — PANTOPRAZOLE SODIUM SCH MG: 40 TABLET, DELAYED RELEASE ORAL at 09:22

## 2019-06-18 RX ADMIN — MAGNESIUM OXIDE TAB 400 MG (241.3 MG ELEMENTAL MG) SCH MG: 400 (241.3 MG) TAB at 09:22

## 2019-06-18 RX ADMIN — POLYETHYLENE GLYCOL 3350 SCH GM: 17 POWDER, FOR SOLUTION ORAL at 09:22

## 2019-06-18 RX ADMIN — SENNOSIDES SCH MG: 8.6 TABLET, FILM COATED ORAL at 21:08

## 2019-06-18 NOTE — PCM.DS
Discharge Summary


Date of Admission: 


06/14/19 04:55





Admitting Physician: 


NIKKI RIZZO





Consults: 





 Consults on Case





06/14/19 04:28


Consult Surgery ROUTINE 





06/17/19 08:58


Consult Cardiology ROUTINE 











Primary Care Provider: 


NIKKI RIZZO








Allergies


Allergies





Sulfa (Sulfonamide Antibiotics) Allergy (Verified 06/13/19 22:42)


 


varicella-zoster immune globulin (h [varicella-zoster immune glob] Adverse 

Reaction (Intermediate, Verified 06/13/19 22:42)


 


 rash and sore bones 











Hospital Summary





- Hospital Course


Hospital Course: 





Pt is a 72 yo female pt of mine from [facility] (recently seeing NPs) with GERD, 

ventral hernia, urinary incontinence, hyperglycemia, and hyperlipidemia who was 

admitted through the ER with small bowel obstruction.  Dr. Rob Rubio was 

consulted but surgery was not needed.  She has been tolerating po and passing 

small amounts of stool. 





On admission her CT abd/pelvis showed bowel herniation ventrally with SBO (also 

diverticulosis and fatty liver but nothing acute).  CXR was nonacute.  WBC were 

nl.  eGFR on admission 34.4; improved to 46 after 2 days.  





Pt was noted to have bradycardia into the 40s during her stay.  Cardiology was 

consulted and advised holter monitor; will do this in swing bed.  Also advised 

sleep study; will be scheduled outpatient.





Pt is starting physical therapy.  Is weak from deconditioning.  Will stay in 

swing bed for PT.





- Vitals & Intake/Output


Vital Signs: 





 Vital Signs











Temperature  98.5 F   06/18/19 07:47


 


Pulse Rate  54 L  06/18/19 07:47


 


Respiratory Rate  20   06/18/19 07:47


 


Blood Pressure  124/72   06/18/19 07:47


 


O2 Sat by Pulse Oximetry  94 L  06/18/19 07:47











Intake & Output: 





 Intake & Output











 06/15/19 06/16/19 06/17/19 06/18/19





 11:59 11:59 11:59 11:59


 


Intake Total 1174 2480 2520 1640


 


Output Total 2030 450  400


 


Balance -856 2030 2520 1240














- Lab


Result Diagrams: 


 06/16/19 05:35





 06/16/19 05:35





- Radiology Exams


Ordered Rad Exams-Entire Visit: 





 Radiology Procedures











 Category Date Time Status


 


 CHEST 2 VIEWS (PA AND LAT) Routine Exams  06/16/19 15:37 Completed














- Procedures and Test


Procedures and Tests throughout Hospitalization: 





 Therapy Orders & Screens





06/14/19 07:29


EKG ROUTINE 


   Comment: 


   Diagnosis: SBO





06/16/19 12:18


EKG STAT 


   Comment: 


   Diagnosis: abdominal kandy





06/17/19 08:59


PT Eval & Treat (MD Order) ROUTINE 


   Reason for Eval:: fatigue, deconditioning


   Diagnosis: abdominal kandy














Discharge Exam


General Appearance: no apparent distress, alert


Neurologic Exam: oriented x 3, cooperative


Eye Exam: eyes nml inspection


Ears, Nose, Throat Exam: moist mucous membranes


Respiratory Exam: normal breath sounds, lungs clear, No crackles/rales, No 

rhonchi, No wheezing


Cardiovascular Exam: regular rate/rhythm, normal heart sounds, No murmur


Gastrointestinal/Abdomen Exam: soft, normal bowel sounds, tenderness (mild, 

epigastrum), mass (hernia palpable midline; soft), No distention, No guarding, 

No rebound


Skin Exam: normal color, warm, dry, No rash





Final Diagnosis/Problem List





- Final Discharge Diagnosis/Problem


(1) Physical deconditioning


Current Visit: Yes   Status: Acute   


Assessment & Plan: 


D/c from acute care and admit to swing bed today.


Code(s): R53.81 - OTHER MALAISE   





(2) SBO (small bowel obstruction)


Current Visit: Yes   Status: Resolved   Code(s): K56.609 - UNSP INTESTNL OBST, 

UNSP AS TO PARTIAL VERSUS COMPLETE OBST   





(3) Bradycardia


Current Visit: Yes   Status: Acute   


Assessment & Plan: 


holter monitor


Code(s): R00.1 - BRADYCARDIA, UNSPECIFIED   





(4) Hypertension


Current Visit: Yes   Status: Chronic   


Assessment & Plan: 


stable


Code(s): I10 - ESSENTIAL (PRIMARY) HYPERTENSION   





(5) Constipation


Current Visit: Yes   Status: Acute   


Assessment & Plan: 


continue miralax


Code(s): K59.00 - CONSTIPATION, UNSPECIFIED   





- Discharge


Disposition: Swing Bed @ [facility]


Condition: Stable


Prescriptions: 


No Action


   Pravastatin Sodium 40 mg PO DAILY


   Omeprazole 40 mg PO BID


   Solifenacin Succinate 10 mg PO DAILY


   Magnesium Oxide 400 mg PO DAILY


   Docusate Sodium 100 mg PO DAILY


   Spironolactone 25 mg PO DAILY


   Ergocalciferol (Vitamin D2) [Vitamin D2] 50,000 unit PO Q7D


Follow up with: 


NIKKI RIZZO [Primary Care Provider] - 1 Week


Geronimo Lombardo MD [CONSULTING PHYSICIAN] - 1 Week

## 2019-06-19 RX ADMIN — SENNOSIDES SCH: 8.6 TABLET, FILM COATED ORAL at 09:57

## 2019-06-19 RX ADMIN — POLYETHYLENE GLYCOL 3350 SCH: 17 POWDER, FOR SOLUTION ORAL at 09:56

## 2019-06-19 RX ADMIN — MAGNESIUM OXIDE TAB 400 MG (241.3 MG ELEMENTAL MG) SCH MG: 400 (241.3 MG) TAB at 09:56

## 2019-06-19 RX ADMIN — SPIRONOLACTONE SCH MG: 25 TABLET, FILM COATED ORAL at 09:56

## 2019-06-19 RX ADMIN — PANTOPRAZOLE SODIUM SCH MG: 40 TABLET, DELAYED RELEASE ORAL at 09:56

## 2019-06-19 RX ADMIN — SENNOSIDES SCH MG: 8.6 TABLET, FILM COATED ORAL at 22:10

## 2019-06-20 RX ADMIN — SPIRONOLACTONE SCH MG: 25 TABLET, FILM COATED ORAL at 09:08

## 2019-06-20 RX ADMIN — MAGNESIUM OXIDE TAB 400 MG (241.3 MG ELEMENTAL MG) SCH MG: 400 (241.3 MG) TAB at 09:08

## 2019-06-20 RX ADMIN — PANTOPRAZOLE SODIUM SCH MG: 40 TABLET, DELAYED RELEASE ORAL at 09:08

## 2019-06-20 RX ADMIN — POLYETHYLENE GLYCOL 3350 SCH: 17 POWDER, FOR SOLUTION ORAL at 09:09

## 2019-06-20 RX ADMIN — SENNOSIDES SCH: 8.6 TABLET, FILM COATED ORAL at 09:09

## 2019-06-20 RX ADMIN — SENNOSIDES SCH MG: 8.6 TABLET, FILM COATED ORAL at 22:25

## 2019-06-21 RX ADMIN — PANTOPRAZOLE SODIUM SCH MG: 40 TABLET, DELAYED RELEASE ORAL at 09:14

## 2019-06-21 RX ADMIN — MAGNESIUM OXIDE TAB 400 MG (241.3 MG ELEMENTAL MG) SCH MG: 400 (241.3 MG) TAB at 09:14

## 2019-06-21 RX ADMIN — SENNOSIDES SCH MG: 8.6 TABLET, FILM COATED ORAL at 21:45

## 2019-06-21 RX ADMIN — SENNOSIDES SCH: 8.6 TABLET, FILM COATED ORAL at 09:14

## 2019-06-21 RX ADMIN — POLYETHYLENE GLYCOL 3350 SCH: 17 POWDER, FOR SOLUTION ORAL at 09:14

## 2019-06-21 RX ADMIN — SPIRONOLACTONE SCH MG: 25 TABLET, FILM COATED ORAL at 09:14

## 2019-06-21 NOTE — PCM.NOTE
Date and Time: 06/21/19 0923





Subjective Assessment: 





PT is doing therapy, she does have some dizziness in the morning or after 

exercising.  Did PT twice yesterday!


Marsha PO well.


Would like her OP cardiology f/u to be in [place].





Objective Exam


General Appearance: no apparent distress, alert, obese


Neurologic Exam: oriented x 3, cooperative


Skin Exam: normal color, warm, dry, No rash


Ears, Nose, Throat Exam: moist mucous membranes


Neck Exam: normal inspection


Respiratory Exam: normal breath sounds, lungs clear, No crackles/rales, No 

rhonchi, No wheezing


Cardiovascular Exam: regular rate/rhythm, normal heart sounds, No murmur


Extremity Exam: normal inspection, No pedal edema, No swelling


Back Exam: normal inspection, No rash





OBJECTIVE DATA


Vital Signs: 


 Vital Signs - 24 hr











  Temp Pulse Resp BP Pulse Ox


 


 06/21/19 08:00  97.7 F  101 H  18  124/59  95


 


 06/20/19 20:00  97.8 F  57 L  18  158/65  96








 Pain Assessment - Last Documented











Pain Intensity                 0


 


[facility] Scale Used                [facility]











Intake and Output: 


 Intake & Output











 06/18/19 06/19/19 06/20/19 06/21/19





 11:59 11:59 11:59 11:59


 


Intake Total  


 


Output Total   800 


 


Balance  


 


Weight 91.1 kg   














Assessment/Plan


(1) Physical deconditioning


Current Visit: No   Status: Acute   


Assessment & Plan: 


Will check with PT regarding her progress.


Code(s): R53.81 - OTHER MALAISE   





(2) Bradycardia


Current Visit: No   Status: Acute   


Assessment & Plan: 


HR down to low of 48 since she has been in swing bed.  Will have her f/u with 

Dr. Gaston outpatient, in [place].


Code(s): R00.1 - BRADYCARDIA, UNSPECIFIED

## 2019-06-21 NOTE — HOLTER
PROCEDURE:  Holter monitor report.



REASON FOR EXAMINATION:  Palpitations. 



DESCRIPTION OF PROCEDURE: The patient was in sinus bradycardia throughout the recording 
with average rate of 57 beats/minute. The maximum rate was 99 beats/minute and the minimum 
rate was 79 beats/minute at 0533 hours. There was one premature ventricular ectopy. No 
evidence of any ventricular tachyarrhythmia. There were rare premature atrial ectopies 
with one short atrial run that consisted of 4 beats at rate of 130 beats/minute. No 
evidence of any long pauses or blocks noted.   



IMPRESSION:

1) SINUS BRADYCARDIA WITH SUGGESTION OF CHRONOTROPIC INCOMPETENCE. 

2) ONE PVC AND RARE PAC'S. 

3) FOUR BEAT SLOW ATRIAL RUN NOTED.

## 2019-06-22 RX ADMIN — PANTOPRAZOLE SODIUM SCH MG: 40 TABLET, DELAYED RELEASE ORAL at 08:40

## 2019-06-22 RX ADMIN — SENNOSIDES SCH: 8.6 TABLET, FILM COATED ORAL at 08:40

## 2019-06-22 RX ADMIN — MAGNESIUM OXIDE TAB 400 MG (241.3 MG ELEMENTAL MG) SCH MG: 400 (241.3 MG) TAB at 08:40

## 2019-06-22 RX ADMIN — SPIRONOLACTONE SCH MG: 25 TABLET, FILM COATED ORAL at 08:40

## 2019-06-22 RX ADMIN — POLYETHYLENE GLYCOL 3350 SCH: 17 POWDER, FOR SOLUTION ORAL at 08:41

## 2019-06-22 RX ADMIN — SENNOSIDES SCH MG: 8.6 TABLET, FILM COATED ORAL at 21:10

## 2019-06-23 RX ADMIN — SPIRONOLACTONE SCH MG: 25 TABLET, FILM COATED ORAL at 11:03

## 2019-06-23 RX ADMIN — PANTOPRAZOLE SODIUM SCH MG: 40 TABLET, DELAYED RELEASE ORAL at 11:03

## 2019-06-23 RX ADMIN — SENNOSIDES SCH: 8.6 TABLET, FILM COATED ORAL at 21:43

## 2019-06-23 RX ADMIN — MAGNESIUM OXIDE TAB 400 MG (241.3 MG ELEMENTAL MG) SCH MG: 400 (241.3 MG) TAB at 11:03

## 2019-06-23 RX ADMIN — POLYETHYLENE GLYCOL 3350 SCH GM: 17 POWDER, FOR SOLUTION ORAL at 11:03

## 2019-06-23 RX ADMIN — SENNOSIDES SCH: 8.6 TABLET, FILM COATED ORAL at 10:30

## 2019-06-24 RX ADMIN — MAGNESIUM OXIDE TAB 400 MG (241.3 MG ELEMENTAL MG) SCH MG: 400 (241.3 MG) TAB at 09:41

## 2019-06-24 RX ADMIN — SENNOSIDES SCH: 8.6 TABLET, FILM COATED ORAL at 09:43

## 2019-06-24 RX ADMIN — SPIRONOLACTONE SCH MG: 25 TABLET, FILM COATED ORAL at 09:41

## 2019-06-24 RX ADMIN — SENNOSIDES SCH MG: 8.6 TABLET, FILM COATED ORAL at 22:01

## 2019-06-24 RX ADMIN — PANTOPRAZOLE SODIUM SCH MG: 40 TABLET, DELAYED RELEASE ORAL at 09:41

## 2019-06-24 RX ADMIN — POLYETHYLENE GLYCOL 3350 SCH: 17 POWDER, FOR SOLUTION ORAL at 09:42

## 2019-06-24 NOTE — PCM.NOTE
Date and Time: 06/24/19 0832





Subjective Assessment: 





Pt had 4 BMs yesterday, not diarrhea.  Carl po well.


Doing therapy.


Her Holter report came back Friday with HR < 50, 40% of the time.





- Review of Systems


Constitutional: No Fever


Respiratory: No Cough





Objective Exam


General Appearance: no apparent distress, alert


Neurologic Exam: oriented x 3, cooperative


Skin Exam: normal color, warm, dry, No rash


Respiratory Exam: normal breath sounds, lungs clear, No crackles/rales, No 

rhonchi, No wheezing


Cardiovascular Exam: regular rate/rhythm, normal heart sounds, No murmur


Gastrointestinal/Abdomen Exam: soft, normal bowel sounds, No tenderness, No 

distention, No mass, No guarding, No rebound


Extremity Exam: normal inspection, No pedal edema, No swelling





OBJECTIVE DATA


Vital Signs: 


 Vital Signs - 24 hr











  Temp Pulse Resp BP Pulse Ox


 


 06/24/19 07:11  98 F  68  18  115/55  98


 


 06/23/19 20:00  98.2 F  59 L  20  139/70  98








 Pain Assessment - Last Documented











Pain Intensity                 0


 


Pain Scale Used                [facility]











Intake and Output: 


 Intake & Output











 06/21/19 06/22/19 06/23/19 06/24/19





 11:59 11:59 11:59 11:59


 


Intake Total 1780 1160 1350 1120


 


Balance 1780 1160 1350 1120


 


Weight  91.1 kg  














Assessment/Plan


(1) Physical deconditioning


Current Visit: No   Status: Acute   


Assessment & Plan: 


Continue PT.  She will likely go home this week sometime.


Code(s): R53.81 - OTHER MALAISE   





(2) Bradycardia


Current Visit: No   Status: Acute   


Assessment & Plan: 


Will discuss with Dr. Gaston the results of Holter monitor report.


Has an outpatient appt with Dr. Gaston in [place] on July 3, 2019.


Code(s): R00.1 - BRADYCARDIA, UNSPECIFIED

## 2019-06-25 RX ADMIN — SENNOSIDES SCH MG: 8.6 TABLET, FILM COATED ORAL at 09:42

## 2019-06-25 RX ADMIN — POLYETHYLENE GLYCOL 3350 SCH GM: 17 POWDER, FOR SOLUTION ORAL at 09:42

## 2019-06-25 RX ADMIN — SPIRONOLACTONE SCH MG: 25 TABLET, FILM COATED ORAL at 09:42

## 2019-06-25 RX ADMIN — SENNOSIDES SCH MG: 8.6 TABLET, FILM COATED ORAL at 22:12

## 2019-06-25 RX ADMIN — PANTOPRAZOLE SODIUM SCH MG: 40 TABLET, DELAYED RELEASE ORAL at 09:42

## 2019-06-25 RX ADMIN — MAGNESIUM OXIDE TAB 400 MG (241.3 MG ELEMENTAL MG) SCH MG: 400 (241.3 MG) TAB at 09:42

## 2019-06-26 VITALS — SYSTOLIC BLOOD PRESSURE: 139 MMHG | OXYGEN SATURATION: 90 % | HEART RATE: 64 BPM | DIASTOLIC BLOOD PRESSURE: 62 MMHG

## 2019-06-26 RX ADMIN — SPIRONOLACTONE SCH MG: 25 TABLET, FILM COATED ORAL at 12:21

## 2019-06-26 RX ADMIN — PANTOPRAZOLE SODIUM SCH MG: 40 TABLET, DELAYED RELEASE ORAL at 12:21

## 2019-06-26 RX ADMIN — MAGNESIUM OXIDE TAB 400 MG (241.3 MG ELEMENTAL MG) SCH MG: 400 (241.3 MG) TAB at 12:21

## 2019-06-26 RX ADMIN — SENNOSIDES SCH: 8.6 TABLET, FILM COATED ORAL at 12:26

## 2019-06-26 RX ADMIN — POLYETHYLENE GLYCOL 3350 SCH GM: 17 POWDER, FOR SOLUTION ORAL at 12:21

## 2019-06-26 NOTE — PCM.DS
Discharge Summary


Date of Admission: 


06/18/19 10:30





Admitting Physician: 


NIKKI RIZZO





Primary Care Provider: 


NIKKI RIZZO








Allergies


Allergies





Sulfa (Sulfonamide Antibiotics) Allergy (Verified 06/13/19 22:42)


 


varicella-zoster immune globulin (h [varicella-zoster immune glob] Adverse 

Reaction (Intermediate, Verified 06/13/19 22:42)


 


 rash and sore bones 











Hospital Summary





- Hospital Course


Hospital Course: 





Pt is a 72 [facility] who was admitted through ER with a bowel 

obstruction.  She did not need surgical management; when she recovered she was 

felt to have physical deconditioning and was admitted to swing bed for therapy.

  Her PT has been going quite well and she is ambulating without any assist.





She was noted to have bradycardia into the 40s.  Holter monitor was done and 

she was found to have HR <50 about 40% of the time.  I did speak to Dr. Lombardo 

and he said it's safe to send the pt home, she may have some increased vagal 

tone but he wants to see outpatient sleep study before treating the 

bradycardia.  





Pt eating well with BMs (sometimes using miralax).





- Vitals & Intake/Output


Vital Signs: 





 Vital Signs











Temperature  98 F   06/26/19 07:25


 


Pulse Rate  64   06/26/19 07:25


 


Respiratory Rate  22   06/26/19 07:25


 


Blood Pressure  139/62   06/26/19 07:25


 


O2 Sat by Pulse Oximetry  90 L  06/26/19 07:25








 Oxygen-Last Documented











O2 Percentage                  5 Liters = 40%














Intake & Output: 





 Intake & Output











 06/23/19 06/24/19 06/25/19 06/26/19





 11:59 11:59 11:59 11:59


 


Intake Total 1350 1120 720 780


 


Balance 1350 1120 720 780


 


Weight   89.4 kg 














- Procedures and Test


Procedures and Tests throughout Hospitalization: 





 Therapy Orders & Screens





06/18/19 10:43


PT Eval & Treat (MD Order) ROUTINE 


   Reason for Eval:: DECONDITIONING R/T SBO AND BRADYCARDIA


   Diagnosis: DECONDITIONING R/T SBO AND BRADYCARDIA


Holter Monitor ONCE 


   Comment: 


   Reason For Exam: 


   Diagnosis: abdominal kanyd





06/19/19 13:51


Holter Monitor Scan-RT ONCE 


   Comment: 


   Reason For Exam: 


   Diagnosis: abdominal kandy














Discharge Exam


General Appearance: no apparent distress, alert


Neurologic Exam: oriented x 3, cooperative


Eye Exam: eyes nml inspection


Ears, Nose, Throat Exam: moist mucous membranes


Neck Exam: normal inspection


Respiratory Exam: normal breath sounds, lungs clear, No crackles/rales, No 

rhonchi, No wheezing


Cardiovascular Exam: regular rate/rhythm, normal heart sounds, No murmur


Gastrointestinal/Abdomen Exam: soft, normal bowel sounds, No tenderness, No 

distention, No mass, No guarding, No rebound


Extremity Exam: normal inspection, No pedal edema, No swelling


Skin Exam: normal color, warm, dry, No rash





Final Diagnosis/Problem List





- Final Discharge Diagnosis/Problem


(1) Physical deconditioning


Current Visit: No   Status: Acute   


Assessment & Plan: 


Much improved, doing well with PT.  Home after PT today; may continue 

outpatient PT if needed.


Code(s): R53.81 - OTHER MALAISE   





(2) Bradycardia


Current Visit: No   Status: Chronic   


Assessment & Plan: 


Has home sleep study scheduled in August.  Seeing Dr. Swenson here in [place] 

next week.


Code(s): R00.1 - BRADYCARDIA, UNSPECIFIED   





- Discharge


Disposition: Home, Self-Care


Condition: Stable


Prescriptions: 


New


   Polyethylene Glycol 3350 17 gm [Miralax Powder 17GM PACKET***] 17 gm PO 

DAILY  packet


   Senna 8.6 mg*** [Senokot 8.6 mg***] 8.6 mg PO BID  tablet





Continue


   Pravastatin Sodium 40 mg PO DAILY


   Omeprazole 40 mg PO BID


   Solifenacin Succinate 10 mg PO DAILY


   Magnesium Oxide 400 mg PO DAILY


   Docusate Sodium 100 mg PO DAILY


   Spironolactone 25 mg PO DAILY


   Ergocalciferol (Vitamin D2) [Vitamin D2] 50,000 unit PO Q7D


Additional Instructions: 


APPOINTMENT WITH DR SWENSON WILL BE IN [place] AT [facility].


Follow up with: 


RUFUS SWENSON [CONSULTING PHYSICIAN] - 07/03/19 12:00 pm


NIKKI RIZZO [Primary Care Provider] - 1 Week

## 2019-06-29 ENCOUNTER — HOSPITAL ENCOUNTER (OUTPATIENT)
Dept: HOSPITAL 33 - ED | Age: 74
Setting detail: OBSERVATION
LOS: 1 days | Discharge: ANOTHER HOSPITAL | End: 2019-06-30
Attending: FAMILY MEDICINE | Admitting: FAMILY MEDICINE
Payer: MEDICARE

## 2019-06-29 DIAGNOSIS — Z79.899: ICD-10-CM

## 2019-06-29 DIAGNOSIS — I12.9: ICD-10-CM

## 2019-06-29 DIAGNOSIS — R42: ICD-10-CM

## 2019-06-29 DIAGNOSIS — K56.609: Primary | ICD-10-CM

## 2019-06-29 DIAGNOSIS — K43.9: ICD-10-CM

## 2019-06-29 DIAGNOSIS — E78.00: ICD-10-CM

## 2019-06-29 DIAGNOSIS — R00.1: ICD-10-CM

## 2019-06-29 DIAGNOSIS — R53.81: ICD-10-CM

## 2019-06-29 DIAGNOSIS — N18.3: ICD-10-CM

## 2019-06-29 DIAGNOSIS — E78.5: ICD-10-CM

## 2019-06-29 LAB
ALBUMIN SERPL-MCNC: 4.6 G/DL (ref 3.5–5)
ALP SERPL-CCNC: 80 U/L (ref 38–126)
ALT SERPL-CCNC: 60 U/L (ref 0–35)
AMYLASE SERPL-CCNC: 83 U/L (ref 30–110)
ANION GAP SERPL CALC-SCNC: 16.1 MEQ/L (ref 5–15)
AST SERPL QL: 61 U/L (ref 14–36)
BASOPHILS # BLD AUTO: 0.03 10*3/UL (ref 0–0.4)
BASOPHILS NFR BLD AUTO: 0.5 % (ref 0–0.4)
BILIRUB BLD-MCNC: 0.9 MG/DL (ref 0.2–1.3)
BUN SERPL-MCNC: 21 MG/DL (ref 7–17)
CALCIUM SPEC-MCNC: 10.4 MG/DL (ref 8.4–10.2)
CHLORIDE SERPL-SCNC: 103 MMOL/L (ref 98–107)
CO2 SERPL-SCNC: 25 MMOL/L (ref 22–30)
CREAT SERPL-MCNC: 1.39 MG/DL (ref 0.52–1.04)
EOSINOPHIL # BLD AUTO: 0.1 10*3/UL (ref 0–0.5)
GLUCOSE SERPL-MCNC: 148 MG/DL (ref 74–106)
GLUCOSE UR-MCNC: NEGATIVE MG/DL
GRANULOCYTES # BLD AUTO: 3.66 10*3/UL (ref 1.4–6.9)
HCT VFR BLD AUTO: 46.8 % (ref 35–47)
HGB BLD-MCNC: 15.6 GM/DL (ref 12–16)
LYMPHOCYTES # SPEC AUTO: 1.32 10*3/UL (ref 1–4.6)
MCH RBC QN AUTO: 29.9 PG (ref 26–32)
MCHC RBC AUTO-ENTMCNC: 33.3 G/DL (ref 32–36)
MONOCYTES # BLD AUTO: 0.71 10*3/UL (ref 0–1.3)
NEUTROPHILS NFR BLD AUTO: 62.9 % (ref 36–66)
PLATELET # BLD AUTO: 229 K/MM3 (ref 150–450)
POTASSIUM SERPLBLD-SCNC: 4.3 MMOL/L (ref 3.5–5.1)
PROT SERPL-MCNC: 7.9 G/DL (ref 6.3–8.2)
PROT UR STRIP-MCNC: NEGATIVE MG/DL
RBC # BLD AUTO: 5.22 M/MM3 (ref 4.1–5.4)
RBC #/AREA URNS HPF: (no result) /HPF (ref 0–2)
SODIUM SERPL-SCNC: 140 MMOL/L (ref 137–145)
WBC # BLD AUTO: 5.8 K/MM3 (ref 4–10.5)
WBC #/AREA URNS HPF: (no result) /HPF (ref 0–5)

## 2019-06-29 PROCEDURE — 36000 PLACE NEEDLE IN VEIN: CPT

## 2019-06-29 PROCEDURE — 93268 ECG RECORD/REVIEW: CPT

## 2019-06-29 PROCEDURE — 74176 CT ABD & PELVIS W/O CONTRAST: CPT

## 2019-06-29 PROCEDURE — 74018 RADEX ABDOMEN 1 VIEW: CPT

## 2019-06-29 PROCEDURE — 83690 ASSAY OF LIPASE: CPT

## 2019-06-29 PROCEDURE — 96360 HYDRATION IV INFUSION INIT: CPT

## 2019-06-29 PROCEDURE — 82150 ASSAY OF AMYLASE: CPT

## 2019-06-29 PROCEDURE — 85025 COMPLETE CBC W/AUTO DIFF WBC: CPT

## 2019-06-29 PROCEDURE — 83735 ASSAY OF MAGNESIUM: CPT

## 2019-06-29 PROCEDURE — 80053 COMPREHEN METABOLIC PANEL: CPT

## 2019-06-29 PROCEDURE — 36415 COLL VENOUS BLD VENIPUNCTURE: CPT

## 2019-06-29 PROCEDURE — 83036 HEMOGLOBIN GLYCOSYLATED A1C: CPT

## 2019-06-29 PROCEDURE — G0378 HOSPITAL OBSERVATION PER HR: HCPCS

## 2019-06-29 PROCEDURE — 81001 URINALYSIS AUTO W/SCOPE: CPT

## 2019-06-29 PROCEDURE — 99285 EMERGENCY DEPT VISIT HI MDM: CPT

## 2019-06-29 PROCEDURE — 96372 THER/PROPH/DIAG INJ SC/IM: CPT

## 2019-06-29 PROCEDURE — 82962 GLUCOSE BLOOD TEST: CPT

## 2019-06-29 RX ADMIN — ONDANSETRON PRN MG: 2 INJECTION, SOLUTION INTRAMUSCULAR; INTRAVENOUS at 20:26

## 2019-06-29 NOTE — XRAY
Indication: Abdomen pain and vomiting.



Multiple contiguous axial images obtained through the abdomen and pelvis

without contrast as ordered.



Comparison: June 14, 2019.



Lung bases again demonstrates fibrosis/scarring and calcified granulomas.  No

infiltrate or effusion.  Heart is not enlarged.  Stable small hiatal hernia.



Stomach again moderately fluid distended.  Again there is a moderate-sized

midline supra umbilical ventral hernia with herniated omental fat and small

bowel loops.  The small bowel loops just proximal to the hernia are slightly

prominent, possible low-grade obstruction.  No free fluid/air.



Stable diffuse colonic diverticulosis, right renal angiomyolipoma, fatty

liver, cholecystectomy, and hysterectomy.  Remaining liver, pancreas, spleen,

adrenal glands, kidneys, ureters, and bladder appear unremarkable for

noncontrast exam.  Stable mild aortoiliac calcifications without AAA.



Osseous structures intact again with mild degenerative changes throughout the

spine.



Impression:

1.  Again moderate-sized ventral hernia with herniated omental fat and small

bowel loops.  Query low-grade obstruction.

2.  Stable colonic diverticulosis, hiatal hernia, fatty liver, right renal

angiomyolipoma, and evidence for old granulomatous disease.



Comment: Preliminary interpretation was made by C.  No critical discrepancy.



CTDI 23.23

## 2019-06-29 NOTE — ERPHSYRPT
- History of Present Illness


Time Seen by Provider: 06/29/19 16:13


Historian: patient


Exam Limitations: no limitations


Patient Subjective Stated Complaint: Abdominal pain


Triage Nursing Assessment: Patient brought into ED via EMS and transferred to 

bed with assist of 1. Patient A+O X 3. Patient's skin pink, warm and dry. 

Patient complains of constant aching pain to abdomen 4/10. Patient's abdomen 

round and hard with BS X4. Patient's last BM today.  Patient states she was 

discharged on Wednesday from being in hospital for 13 days with abdominal pain 

and vomiting.  Patient states she has been vomiting.


Physician History: 





73-year-old white female with history of migraines, hypercholesterolemia, high 

blood pressure, bronchitis, GERD, gallbladder disease, renal disease, fibroids





Who was recently released from this hospital where she was treated for small 

bowel obstruction for approximately 13 days


She arrives with complaint of abdominal pain located in the epigastric and 

right upper quadrant and some vomiting symptoms since today


Patient states she's had 2 bowel movements





Past medical history includes migraines, hypercholesterolemia, high blood 

pressure, bronchitis, GERD, gallbladder disease, renal disease, fibroids





Past surgical history includes cholecystectomy and hysterectomy


Timing/Duration: today


Activities at Onset: none


Quality: cramping


Abdominal Pain Onset Location: RUQ, epigastric


Pain Radiation: no radiation


Severity of Pain-Max: moderate


Severity of Pain-Current: moderate


Associated Symptoms: nausea, vomiting, No denies symptoms, No back, No chest 

pain, No diaphoresis, No diarrhea, No fever/chills, No fatigue, No headache, No 

heartburn, No loss of appetite, No neck pain, No rash, No shortness of breath, 

No syncope


Previous symptoms: same symptoms as today (recently released secondary to small 

bowel obstruction)


Allergies/Adverse Reactions: 








Sulfa (Sulfonamide Antibiotics) Allergy (Verified 06/29/19 16:01)


 


varicella-zoster immune globulin (h [varicella-zoster immune glob] Adverse 

Reaction (Intermediate, Verified 06/29/19 16:01)


 


 rash and sore bones 





Home Medications: 








Pravastatin Sodium 40 mg PO DAILY 01/20/15 [History]


Docusate Sodium 100 mg PO HS 06/13/19 [History]


Magnesium Oxide 400 mg PO DAILY 06/13/19 [History]


Solifenacin Succinate 10 mg PO DAILY 06/13/19 [History]


Spironolactone 25 mg PO DAILY 06/13/19 [History]


Ergocalciferol (Vitamin D2) [Vitamin D2] 50,000 unit PO Q7D 06/14/19 [History]


Famotidine/Calcium Carb/Mag [Pepcid Complete Tablet Chew] 1 tab.chew PO BIDPRN 

PRN 06/26/19 [History]





Hx Tetanus, Diphtheria Vaccination/Date Given: Yes


Hx Influenza Vaccination/Date Given: Yes


Hx Pneumococcal Vaccination/Date Given: Yes


Immunizations Up to Date: Yes





- Review of Systems


Constitutional: No Fever, No Chills


Eyes: No Symptoms


Ears, Nose, & Throat: No Symptoms


Respiratory: No Cough, No Dyspnea


Cardiac: No Chest Pain, No Edema, No Syncope


Abdominal/Gastrointestinal: Abdominal Pain, Nausea, Vomiting, No Diarrhea, No 

Constipation, No Hematemesis, No Hematochezia, No Melena, No Dysphagia, No 

Appetite Changes


Genitourinary Symptoms: No Dysuria


Musculoskeletal: No Back Pain, No Neck Pain


Skin: No Rash


Neurological: No Dizziness, No Focal Weakness, No Sensory Changes


Psychological: No Symptoms


Endocrine: No Symptoms


All Other Systems: Reviewed and Negative





- Past Medical History


Pertinent Past Medical History: Yes


Neurological History: Migraines


ENT History: No Pertinent History


Cardiac History: High Cholesterol, Hypertension


Respiratory History: Bronchitis, Other


Endocrine Medical History: No Pertinent History


Musculoskeletal History: No Pertinent History


GI Medical History: GERD, Gallbladder Disease


 History: Renal Disease


Psycho-Social History: No Pertinent History


Female Reproductive Disorders: Fibroids, Other





- Past Surgical History


Past Surgical History: Yes


Neuro Surgical History: No Pertinent History


Cardiac: No Pertinent History


Respiratory: No Pertinent History


Gastrointestinal: Cholecystectomy


Genitourinary: No Pertinent History


Musculoskeletal: No Pertinent History


Female Surgical History: Hysterectomy





- Social History


Smoking Status: Never smoker


Exposure to second hand smoke: Yes


Drug Use: none


Patient Lives Alone: Yes





- Female History


Hx Pregnant Now: No





- Nursing Vital Signs


Nursing Vital Signs: 


 Initial Vital Signs











Pulse Rate  60   06/29/19 16:01


 


Respiratory Rate  18   06/29/19 16:01


 


Blood Pressure  179/74   06/29/19 16:01


 


O2 Sat by Pulse Oximetry  95   06/29/19 16:01








 Pain Scale











Pain Intensity                 6

















- Physical Exam


General Appearance: mild distress, alert


Eye Exam: PERRL/EOMI, eyes nml inspection


Ears, Nose, Throat Exam: normal ENT inspection, pharynx normal, moist mucous 

membranes


Neck Exam: normal inspection, non-tender, supple, full range of motion


Respiratory Exam: normal breath sounds, lungs clear, No respiratory distress


Cardiovascular Exam: regular rate/rhythm, normal heart sounds, capillary refill 

<2 sec


Gastrointestinal/Abdomen Exam: soft, normal bowel sounds, tenderness (tender 

epigastric region), distention (distended in the epigastric region)


Back Exam: normal inspection, normal range of motion, No CVA tenderness, No 

vertebral tenderness


Extremity Exam: normal inspection, normal range of motion, pelvis stable


Neurologic Exam: alert, oriented x 3, cooperative, CNs II-XII nml as tested, 

normal mood/affect, nml cerebellar function, sensation nml, No motor deficits


Skin Exam: normal color, warm, dry


**SpO2 Interpretation**: normal (95%)


SpO2: 95





- Course


Nursing assessment & vital signs reviewed: Yes





- CT Exams


  ** Abdomen/Pelvis


CT Interpretation: Tele-radiologist Report (CT abdomen and pelvis: Impression 

1.  Large anterior abdominal lobe midline defect with fat and bowel herniation.

  Interval development of caliber or change in the small bowel at the level of 

the neck likely representing partials low-grade small bowel obstruction.2.  

There additional nonemergent findings discussed in the body of the report)


Ordered Tests: 


 Active Orders 24 hr











 Category Date Time Status


 


 IV Insertion STAT Care  06/29/19 16:18 Active


 


 ABDOMEN AND PELVIS W/0 CONTRAS [CT] Stat Exams  06/29/19 16:22 Taken


 


 NG TUBE PLACEMENT (RAD) Stat Exams  06/29/19 Ordered


 


 AMYLASE Stat Lab  06/29/19 16:18 Completed


 


 CBC W DIFF Stat Lab  06/29/19 16:18 Completed


 


 CMP Stat Lab  06/29/19 16:18 Completed


 


 LIPASE Stat Lab  06/29/19 16:18 Completed


 


 UA W/RFX UR CULTURE Stat Lab  06/29/19 15:55 Completed








Medication Summary














Discontinued Medications














Generic Name Dose Route Start Last Admin





  Trade Name Freq  PRN Reason Stop Dose Admin


 


Sodium Chloride  1,000 mls @ 999 mls/hr  06/29/19 16:18  06/29/19 17:42





  Sodium Chloride 0.9% 1000 Ml  IV  06/29/19 17:18  Infused





  .Q1H1M STA   Infusion





     





     





     





     


 


Sodium Chloride  Confirm  06/29/19 16:34  





  Sodium Chloride 0.9% 1000 Ml  Administered  06/29/19 16:35  





  Dose   





  1,000 mls @ ud   





  .ROUTE   





  .STK-MED ONE   





     





     





     





     


 


Promethazine HCl  25 mg  06/29/19 18:07  06/29/19 18:17





  Phenergan 25 Mg Inj***  IM  06/29/19 18:08  25 mg





  STAT ONE   Administration





     





     





     





     











Lab/Rad Data: 


 Laboratory Result Diagrams





 06/29/19 16:18 





 06/29/19 16:18 





 Laboratory Results











  06/29/19 06/29/19 06/29/19 Range/Units





  16:18 16:18 15:55 


 


WBC   5.8   (4.0-10.5)  K/mm3


 


RBC   5.22   (4.1-5.4)  M/mm3


 


Hgb   15.6   (12.0-16.0)  gm/dl


 


Hct   46.8   (35-47)  %


 


MCV   89.7   ()  fl


 


MCH   29.9   (26-32)  pg


 


MCHC   33.3   (32-36)  g/dl


 


RDW   13.7   (11.5-14.0)  %


 


Plt Count   229   (150-450)  K/mm3


 


MPV   10.7 H   (6-9.5)  fl


 


Gran %   62.9   (36.0-66.0)  %


 


Eos # (Auto)   0.10   (0-0.5)  


 


Absolute Lymphs (auto)   1.32   (1.0-4.6)  


 


Absolute Monos (auto)   0.71   (0.0-1.3)  


 


Lymphocytes %   22.7 L   (24.0-44.0)  %


 


Monocytes %   12.2 H   (0.0-12.0)  %


 


Eosinophils %   1.7   (0.00-5.0)  %


 


Basophils %   0.5   (0.0-0.4)  %


 


Absolute Granulocytes   3.66   (1.4-6.9)  


 


Basophils #   0.03   (0-0.4)  


 


Sodium  140    (137-145)  mmol/L


 


Potassium  4.3    (3.5-5.1)  mmol/L


 


Chloride  103    ()  mmol/L


 


Carbon Dioxide  25    (22-30)  mmol/L


 


Anion Gap  16.1 H    (5-15)  MEQ/L


 


BUN  21 H    (7-17)  mg/dL


 


Creatinine  1.39 H    (0.52-1.04)  mg/dL


 


Estimated GFR  39.6    ML/MIN


 


Glucose  148 H    ()  mg/dL


 


Calcium  10.4 H    (8.4-10.2)  mg/dL


 


Total Bilirubin  0.90    (0.2-1.3)  mg/dL


 


AST  61 H    (14-36)  U/L


 


ALT  60 H    (0-35)  U/L


 


Alkaline Phosphatase  80    ()  U/L


 


Serum Total Protein  7.9    (6.3-8.2)  g/dL


 


Albumin  4.6    (3.5-5.0)  g/dL


 


Amylase  83    ()  U/L


 


Lipase  97    ()  U/L


 


Urine Color    YELLOW  (YELLOW)  


 


Urine Appearance    CLEAR  (CLEAR)  


 


Urine pH    7.0  (5-6)  


 


Ur Specific Gravity    1.009  (1.005-1.025)  


 


Urine Protein    NEGATIVE  (Negative)  


 


Urine Ketones    NEGATIVE  (NEGATIVE)  


 


Urine Blood    NEGATIVE  (0-5)  Raffy/ul


 


Urine Nitrite    NEGATIVE  (NEGATIVE)  


 


Urine Bilirubin    NEGATIVE  (NEGATIVE)  


 


Urine Urobilinogen    NEGATIVE  (0-1)  mg/dL


 


Ur Leukocyte Esterase    NEGATIVE  (NEGATIVE)  


 


Urine WBC (Auto)    NONE  (0-5)  /HPF


 


Urine RBC (Auto)    0-2  (0-2)  /HPF


 


U Epithel Cells (Auto)    NONE  (FEW)  /HPF


 


Urine Bacteria (Auto)    NONE SEEN  (NEGATIVE)  /HPF


 


Urine Mucus (Auto)    SLIGHT  (NEGATIVE)  /HPF


 


Urine Culture Reflexed    NO  (NO)  


 


Urine Glucose    NEGATIVE  (NEGATIVE)  mg/dL














- Progress


Progress: improved


Progress Note: 





06/29/19 18:18


Patient continues to be nauseous after Zofran.


Phenergan 25 mg I'm and has been ordered.





NG tube has been ordered


I discussed the case with Dr. Ellis will place patient on observation 

telemetry NG tube to low intermittent suction.


Morphine 4 mg every 4 hours as needed for pain Zofran 4 mg as needed for nausea 

and vomiting.


I discussed the case briefly with Dr. Casarez will obtain surgery consult.





- Departure


Departure Disposition: Observation


Clinical Impression: 


 Small bowel obstruction





Abdominal pain


Qualifiers:


 Abdominal location: epigastric Qualified Code(s): R10.13 - Epigastric pain





Ventral hernia


Qualifiers:


 Obstruction and gangrene presence: with obstruction but without gangrene 

Qualified Code(s): K43.6 - Other and unspecified ventral hernia with obstruction

, without gangrene





Condition: Fair


Critical Care Time: No


Referrals: 


NIKKI ELLIS [Primary Care Provider] -

## 2019-06-30 VITALS — DIASTOLIC BLOOD PRESSURE: 76 MMHG | HEART RATE: 75 BPM | SYSTOLIC BLOOD PRESSURE: 155 MMHG

## 2019-06-30 VITALS — OXYGEN SATURATION: 94 %

## 2019-06-30 LAB
ALBUMIN SERPL-MCNC: 4 G/DL (ref 3.5–5)
ALP SERPL-CCNC: 81 U/L (ref 38–126)
ALT SERPL-CCNC: 49 U/L (ref 0–35)
ANION GAP SERPL CALC-SCNC: 15.4 MEQ/L (ref 5–15)
AST SERPL QL: 28 U/L (ref 14–36)
BASOPHILS # BLD AUTO: 0.01 10*3/UL (ref 0–0.4)
BASOPHILS NFR BLD AUTO: 0.1 % (ref 0–0.4)
BILIRUB BLD-MCNC: 0.7 MG/DL (ref 0.2–1.3)
BUN SERPL-MCNC: 18 MG/DL (ref 7–17)
CALCIUM SPEC-MCNC: 9.7 MG/DL (ref 8.4–10.2)
CHLORIDE SERPL-SCNC: 105 MMOL/L (ref 98–107)
CO2 SERPL-SCNC: 27 MMOL/L (ref 22–30)
CREAT SERPL-MCNC: 1.24 MG/DL (ref 0.52–1.04)
EOSINOPHIL # BLD AUTO: 0 10*3/UL (ref 0–0.5)
GLUCOSE SERPL-MCNC: 152 MG/DL (ref 74–106)
GRANULOCYTES # BLD AUTO: 7.9 10*3/UL (ref 1.4–6.9)
HCT VFR BLD AUTO: 46.5 % (ref 35–47)
HGB BLD-MCNC: 15.7 GM/DL (ref 12–16)
LYMPHOCYTES # SPEC AUTO: 0.65 10*3/UL (ref 1–4.6)
MCH RBC QN AUTO: 30.1 PG (ref 26–32)
MCHC RBC AUTO-ENTMCNC: 33.8 G/DL (ref 32–36)
MONOCYTES # BLD AUTO: 0.46 10*3/UL (ref 0–1.3)
NEUTROPHILS NFR BLD AUTO: 87.6 % (ref 36–66)
PLATELET # BLD AUTO: 261 K/MM3 (ref 150–450)
POTASSIUM SERPLBLD-SCNC: 3.9 MMOL/L (ref 3.5–5.1)
PROT SERPL-MCNC: 6.9 G/DL (ref 6.3–8.2)
RBC # BLD AUTO: 5.21 M/MM3 (ref 4.1–5.4)
SODIUM SERPL-SCNC: 144 MMOL/L (ref 137–145)
WBC # BLD AUTO: 9 K/MM3 (ref 4–10.5)

## 2019-06-30 RX ADMIN — ONDANSETRON PRN MG: 2 INJECTION, SOLUTION INTRAMUSCULAR; INTRAVENOUS at 08:12

## 2019-06-30 NOTE — PCM.DS
Discharge Summary


Date of Admission: 


06/29/19 19:05





Admitting Physician: 


NIKKI RIZZO





Consults: 





 Consults on Case





06/29/19 19:35


Consult Surgery ROUTINE 











Primary Care Provider: 


NIKKI RIZZO








Allergies


Allergies





Sulfa (Sulfonamide Antibiotics) Allergy (Verified 06/29/19 16:01)


 


varicella-zoster immune globulin (h [varicella-zoster immune glob] Adverse 

Reaction (Intermediate, Verified 06/29/19 16:01)


 


 rash and sore bones 











Hospital Summary





- Hospital Course


Hospital Course: 





 is a 73 year old female pt of [facility] with hx of 

migraine, hyperlipidemia, HTN, GERD, gallbladder dz, and chronic renal failure 

who was admitted through the ER with small bowel obstruction.  CT abd/pelvis w/

o contrast showed mod size midline supra umbilical ventral hernia with 

herniated omental fat and small bowel lopps (prox to hernia loops are slightly 

prominent, favoring obstruction).  No free fluid/air.  WBC were wnl and eGFR 

was 39.6 (today is improved to 45.2).





Pt was discharged from hospital 4d ago - she felt very good.  Yesterday she had 

a Boost, took her meds, then ate ham, chips, and a pepsi then 1 hour later felt 

nauseated.  She came to ER later in the afternoon and started vomiting.  NG 

tube was placed.  Her pain is diffuse abdominal; was 8-9/10 but now is 2-3/10.  

Worse with movement.  Vomitus was brown.  





She had been admitted at [facility] just over 2 weeks ago with SBO, was seen by 

surgery and was managed nonsurgically.  





Toward the end of her stay she was noted to be deconditioned and have 

bradycardia.  She stayed on in swing bed and received PT.  She also had a 

Holter monitor which showed HR < 50 about 40% of the time.  I spoke with Dr. Lombardo, on for Dr. Swenson, about this and he wanted her to have a sleep study 

before deciding whether to treat (sleep study had to be scheduled outpatient 

and is scheduled for August).  She was scheduled to see Dr. Swenson in [place] 

this week.





Dr. Rodas would like to perform surgery but it needs to be done where there 

is cardiology support.  I will discuss the case with Dr. Garcia, on call for 

[facility] cardiology, then hospitalist; will transfer pt this afternoon. 





- Vitals & Intake/Output


Vital Signs: 





 Vital Signs











Temperature  98.8 F   06/30/19 11:54


 


Pulse Rate  75   06/30/19 11:54


 


Respiratory Rate  18   06/30/19 11:54


 


Blood Pressure  155/76   06/30/19 11:54


 


O2 Sat by Pulse Oximetry  94 L  06/30/19 11:54











Intake & Output: 





 Intake & Output











 06/28/19 06/29/19 06/30/19 07/01/19





 11:59 11:59 11:59 11:59


 


Intake Total   380 0


 


Output Total   1575 


 


Balance   -1195 0


 


Weight   88.451 kg 














- Lab


Result Diagrams: 


 06/30/19 05:30





 06/30/19 05:30


Lab Results-Last 24 Hrs: 





 Accuchecks











Date                           06/30/19


 


Date                           06/30/19


 


Date                           06/29/19


 


Time                           06:43


 


Time                           00:30


 


Time                           21:30


 


Accucheck Value:               152


 


Accucheck Value:               156


 


Accucheck Value:               147











 Lab Results-Last 24 Hours











  06/29/19 06/29/19 06/29/19 Range/Units





  15:55 16:18 16:18 


 


WBC   5.8   (4.0-10.5)  K/mm3


 


RBC   5.22   (4.1-5.4)  M/mm3


 


Hgb   15.6   (12.0-16.0)  gm/dl


 


Hct   46.8   (35-47)  %


 


MCV   89.7   ()  fl


 


MCH   29.9   (26-32)  pg


 


MCHC   33.3   (32-36)  g/dl


 


RDW   13.7   (11.5-14.0)  %


 


Plt Count   229   (150-450)  K/mm3


 


MPV   10.7 H   (6-9.5)  fl


 


Gran %   62.9   (36.0-66.0)  %


 


Eos # (Auto)   0.10   (0-0.5)  


 


Absolute Lymphs (auto)   1.32   (1.0-4.6)  


 


Absolute Monos (auto)   0.71   (0.0-1.3)  


 


Lymphocytes %   22.7 L   (24.0-44.0)  %


 


Monocytes %   12.2 H   (0.0-12.0)  %


 


Eosinophils %   1.7   (0.00-5.0)  %


 


Basophils %   0.5   (0.0-0.4)  %


 


Absolute Granulocytes   3.66   (1.4-6.9)  


 


Basophils #   0.03   (0-0.4)  


 


Sodium    140  (137-145)  mmol/L


 


Potassium    4.3  (3.5-5.1)  mmol/L


 


Chloride    103  ()  mmol/L


 


Carbon Dioxide    25  (22-30)  mmol/L


 


Anion Gap    16.1 H  (5-15)  MEQ/L


 


BUN    21 H  (7-17)  mg/dL


 


Creatinine    1.39 H  (0.52-1.04)  mg/dL


 


Estimated GFR    39.6  ML/MIN


 


Glucose    148 H  ()  mg/dL


 


Hemoglobin A1c     (4.5-6.0)  %


 


Calcium    10.4 H  (8.4-10.2)  mg/dL


 


Total Bilirubin    0.90  (0.2-1.3)  mg/dL


 


AST    61 H  (14-36)  U/L


 


ALT    60 H  (0-35)  U/L


 


Alkaline Phosphatase    80  ()  U/L


 


Serum Total Protein    7.9  (6.3-8.2)  g/dL


 


Albumin    4.6  (3.5-5.0)  g/dL


 


Amylase    83  ()  U/L


 


Lipase    97  ()  U/L


 


Urine Color  YELLOW    (YELLOW)  


 


Urine Appearance  CLEAR    (CLEAR)  


 


Urine pH  7.0    (5-6)  


 


Ur Specific Gravity  1.009    (1.005-1.025)  


 


Urine Protein  NEGATIVE    (Negative)  


 


Urine Ketones  NEGATIVE    (NEGATIVE)  


 


Urine Blood  NEGATIVE    (0-5)  Raffy/ul


 


Urine Nitrite  NEGATIVE    (NEGATIVE)  


 


Urine Bilirubin  NEGATIVE    (NEGATIVE)  


 


Urine Urobilinogen  NEGATIVE    (0-1)  mg/dL


 


Ur Leukocyte Esterase  NEGATIVE    (NEGATIVE)  


 


Urine WBC (Auto)  NONE    (0-5)  /HPF


 


Urine RBC (Auto)  0-2    (0-2)  /HPF


 


U Epithel Cells (Auto)  NONE    (FEW)  /HPF


 


Urine Bacteria (Auto)  NONE SEEN    (NEGATIVE)  /HPF


 


Urine Mucus (Auto)  SLIGHT    (NEGATIVE)  /HPF


 


Urine Culture Reflexed  NO    (NO)  


 


Urine Glucose  NEGATIVE    (NEGATIVE)  mg/dL














  06/30/19 06/30/19 06/30/19 Range/Units





  05:30 05:30 07:45 


 


WBC  9.0    (4.0-10.5)  K/mm3


 


RBC  5.21    (4.1-5.4)  M/mm3


 


Hgb  15.7    (12.0-16.0)  gm/dl


 


Hct  46.5    (35-47)  %


 


MCV  89.3    ()  fl


 


MCH  30.1    (26-32)  pg


 


MCHC  33.8    (32-36)  g/dl


 


RDW  13.8    (11.5-14.0)  %


 


Plt Count  261    (150-450)  K/mm3


 


MPV  9.6 H    (6-9.5)  fl


 


Gran %  87.6 H    (36.0-66.0)  %


 


Eos # (Auto)  0    (0-0.5)  


 


Absolute Lymphs (auto)  0.65 L    (1.0-4.6)  


 


Absolute Monos (auto)  0.46    (0.0-1.3)  


 


Lymphocytes %  7.2 L    (24.0-44.0)  %


 


Monocytes %  5.1    (0.0-12.0)  %


 


Eosinophils %  0.0    (0.00-5.0)  %


 


Basophils %  0.1    (0.0-0.4)  %


 


Absolute Granulocytes  7.90 H    (1.4-6.9)  


 


Basophils #  0.01    (0-0.4)  


 


Sodium   144   (137-145)  mmol/L


 


Potassium   3.9   (3.5-5.1)  mmol/L


 


Chloride   105   ()  mmol/L


 


Carbon Dioxide   27   (22-30)  mmol/L


 


Anion Gap   15.4 H   (5-15)  MEQ/L


 


BUN   18 H   (7-17)  mg/dL


 


Creatinine   1.24 H   (0.52-1.04)  mg/dL


 


Estimated GFR   45.2   ML/MIN


 


Glucose   152 H   ()  mg/dL


 


Hemoglobin A1c    5.65  (4.5-6.0)  %


 


Calcium   9.7   (8.4-10.2)  mg/dL


 


Total Bilirubin   0.70   (0.2-1.3)  mg/dL


 


AST   28   (14-36)  U/L


 


ALT   49 H   (0-35)  U/L


 


Alkaline Phosphatase   81   ()  U/L


 


Serum Total Protein   6.9   (6.3-8.2)  g/dL


 


Albumin   4.0   (3.5-5.0)  g/dL


 


Amylase     ()  U/L


 


Lipase     ()  U/L


 


Urine Color     (YELLOW)  


 


Urine Appearance     (CLEAR)  


 


Urine pH     (5-6)  


 


Ur Specific Gravity     (1.005-1.025)  


 


Urine Protein     (Negative)  


 


Urine Ketones     (NEGATIVE)  


 


Urine Blood     (0-5)  Raffy/ul


 


Urine Nitrite     (NEGATIVE)  


 


Urine Bilirubin     (NEGATIVE)  


 


Urine Urobilinogen     (0-1)  mg/dL


 


Ur Leukocyte Esterase     (NEGATIVE)  


 


Urine WBC (Auto)     (0-5)  /HPF


 


Urine RBC (Auto)     (0-2)  /HPF


 


U Epithel Cells (Auto)     (FEW)  /HPF


 


Urine Bacteria (Auto)     (NEGATIVE)  /HPF


 


Urine Mucus (Auto)     (NEGATIVE)  /HPF


 


Urine Culture Reflexed     (NO)  


 


Urine Glucose     (NEGATIVE)  mg/dL











Micro Results-Entire Visit: 





 Accuchecks











Date                           06/30/19


 


Date                           06/30/19


 


Date                           06/29/19


 


Time                           06:43


 


Time                           00:30


 


Time                           21:30


 


Accucheck Value:               152


 


Accucheck Value:               156


 


Accucheck Value:               147

















- Radiology Exams


Ordered Rad Exams-Entire Visit: 





 Radiology Procedures











 Category Date Time Status


 


 ABDOMEN AND PELVIS W/0 CONTRAS [CT] Stat Exams  06/29/19 16:22 Completed


 


 KUB Stat Exams  06/29/19 19:35 Completed














Discharge Exam


General Appearance: no apparent distress, alert


Neurologic Exam: oriented x 3, cooperative


Eye Exam: eyes nml inspection


Ears, Nose, Throat Exam: moist mucous membranes


Neck Exam: normal inspection, non-tender, No lymphadenopathy


Respiratory Exam: normal breath sounds, lungs clear, No crackles/rales, No 

rhonchi, No wheezing


Cardiovascular Exam: regular rate/rhythm, normal heart sounds


Gastrointestinal/Abdomen Exam: soft, distention (approx 20z34dd mass in midline)

, No normal bowel sounds (hyperactive), No tenderness, No guarding, No rebound


Back Exam: normal inspection, No rash


Extremity Exam: normal inspection, No pedal edema, No swelling





Final Diagnosis/Problem List





- Final Discharge Diagnosis/Problem


(1) Small bowel obstruction


Current Visit: Yes   Status: Acute   


Assessment & Plan: 


NG in place.  Needs surgical treatment. Transfer to higher level of care where 

cardiology available - Dr. Spivey at [facility]. They will notify Dr. Sadler 

when pt arrives, per Dr. Rodas's instructions.


Code(s): K56.609 - UNSP INTESTNL OBST, UNSP AS TO PARTIAL VERSUS COMPLETE OBST 

  





(2) Chronic renal failure


Current Visit: Yes   Status: Acute   


Assessment & Plan: 


improved function.








(3) Physical deconditioning


Current Visit: No   Status: Chronic   


Assessment & Plan: 


may need swing bed/therapy again upon discharge. discussed w pt.


Code(s): R53.81 - OTHER MALAISE   





(4) Bradycardia


Current Visit: No   Status: Chronic   Code(s): R00.1 - BRADYCARDIA, UNSPECIFIED

   





(5) Hypertension


Current Visit: No   Status: Chronic   


Assessment & Plan: 


has required prn hydralazine IV.


Code(s): I10 - ESSENTIAL (PRIMARY) HYPERTENSION   





- Discharge


Disposition: DC TO [facility]


Condition: Stable


Prescriptions: 


No Action


   Pravastatin Sodium 40 mg PO DAILY


   Solifenacin Succinate 10 mg PO DAILY


   Magnesium Oxide 400 mg PO DAILY


   Spironolactone 25 mg PO DAILY


   Ergocalciferol (Vitamin D2) [Vitamin D2] 50,000 unit PO CLARIFY


   Polyethylene Glycol 3350 17 gm [Miralax Powder 17GM PACKET***] 17 gm PO 

DAILY  packet


   Famotidine/Calcium Carb/Mag [Pepcid Complete Tablet Chew] 1 tab.chew PO 

BIDPRN PRN


     PRN Reason: Stomach Upset


   Sennosides [Senna Laxative] 8.6 mg PO DAILY


Follow up with: 


RUFUS SWENSON [CONSULTING PHYSICIAN] - 07/03/19 12:00 pm (at [facility] )


NIKKI RIZZO [Primary Care Provider] - 1 Week

## 2019-06-30 NOTE — XRAY
Indication: NG tube placement.



KUB demonstrates NG tube tip in the left upper quadrant of the abdomen

presumed in the stomach.  No focal bowel dilatation or free air.  Right upper

quadrant cholecystectomy clips.  Osseous structures intact.



Comment: Preliminary interpretation was made by [facility].  No discrepancy.

## 2019-06-30 NOTE — PCM.HP
History of Present Illness





- Chief Complaint


Chief Complaint: abdominal pain


History of Present Illness: 


 is a 73 year old female pt of [facility] with hx of 

migraine, hyperlipidemia, HTN, GERD, gallbladder dz, and chronic renal failure 

who was admitted through the ER with small bowel obstruction.  CT abd/pelvis w/

o contrast showed mod size midline supra umbilical ventral hernia with 

herniated omental fat and small bowel lopps (prox to hernia loops are slightly 

prominent, favoring obstruction).  No free fluid/air.  WBC were wnl and eGFR 

was 39.6 (today is improved to 45.2).





Pt was discharged from hospital 4d ago - she felt very good.  Yesterday she had 

a Boost, took her meds, then ate ham, chips, and a pepsi then 1 hour later felt 

nauseated.  She came to ER later in the afternoon and started vomiting.  NG 

tube was placed.  Her pain is diffuse abdominal; was 8-9/10 but now is 2-3/10.  

Worse with movement.  Vomitus was brown.  





She had been admitted at [facility] just over 2 weeks ago with SBO, was seen by 

surgery and was managed nonsurgically.  





Toward the end of her stay she was noted to be deconditioned and have 

bradycardia.  She stayed on in swing bed and received PT.  She also had a 

Holter monitor which showed HR < 50 about 40% of the time.  I spoke with Dr. Lombardo, on for Dr. Gaston, about this and he wanted her to have a sleep study 

before deciding whether to treat (sleep study had to be scheduled outpatient 

and is scheduled for August).  She was scheduled to see Dr. Gaston in [place] 

this week.





- Review of Systems


Cardiac: Edema (chronic puffy ankles)


Neurological: Dizziness ("unbalanced")


Psychological: Depression (intermittent), No Anxiety, No Suicidal Ideations, No 

Homicidal Ideations


All Other Systems: Reviewed and Negative





Medications & Allergies


Home Medications: 


 Home Medication List





Pravastatin Sodium 40 mg PO DAILY 01/20/15 [History Confirmed 06/30/19]


Magnesium Oxide 400 mg PO DAILY 06/13/19 [History Confirmed 06/30/19]


Solifenacin Succinate 10 mg PO DAILY 06/13/19 [History Confirmed 06/30/19]


Spironolactone 25 mg PO DAILY 06/13/19 [History Confirmed 06/30/19]


Ergocalciferol (Vitamin D2) [Vitamin D2] 50,000 unit PO CLARIFY 06/14/19 [

History Confirmed 06/30/19]


Famotidine/Calcium Carb/Mag [Pepcid Complete Tablet Chew] 1 tab.chew PO BIDPRN 

PRN 06/26/19 [History Confirmed 06/30/19]


Polyethylene Glycol 3350 17 gm [Miralax Powder 17GM PACKET***] 17 gm PO DAILY  

packet 06/26/19 [Rx Confirmed 06/30/19]


Sennosides [Senna Laxative] 8.6 mg PO DAILY 06/30/19 [History Confirmed 06/30/19

]








Allergies/Adverse Reactions: 


 Allergies











Allergy/AdvReac Type Severity Reaction Status Date / Time


 


Sulfa (Sulfonamide Allergy   Verified 06/29/19 16:01





Antibiotics)     


 


varicella-zoster immune AdvReac Intermediate  Verified 06/29/19 16:01





globulin (h     





[varicella-zoster immune     





glob]     














- Past Medical History


Past Medical History: Yes


Neurological History: Migraines


ENT History: No Pertinent History


Cardiac History: High Cholesterol, Hypertension


Respiratory History: Bronchitis, Other


Endocrine Medical History: No Pertinent History


Musculoskelatal History: No Pertinent History


GI Medical History: GERD, Gallbladder Disease


 History: Renal Disease


Pyscho-Social History: No Pertinent History


Reproductive Disorders: Fibroids, Other





- Female History


Are you pregnant now?: No





- Past Surgical History


Past Surgical History: Yes


Neuro Surgical History: No Pertinent History


Cardiac History: No Pertinent History


Respiratory Surgery: No Pertinent History


GI Surgical History: Cholecystectomy


Genitourinary Surgical Hx: No Pertinent History


Musculskeletal Surgical Hx: No Pertinent History


Female Surgical History: Hysterectomy





- Social History


Smoking Status: Never smoker


Exposure to second hand smoke: No


Alcohol: None


Drug Use: none





- Physical Exam


Vital Signs: 


 Vital Signs - 24 hr











  Temp Pulse Resp BP Pulse Ox


 


 06/30/19 11:54  98.8 F  75  18  155/76  94 L


 


 06/30/19 07:01  99.0 F  82  18  135/60  94 L


 


 06/30/19 05:43      94 L


 


 06/30/19 04:00  98.7 F  71  19  146/65  94 L


 


 06/30/19 00:00  98.0 F  56 L  19  198/86  92 L


 


 06/29/19 18:51   96 H  16   98


 


 06/29/19 18:21      95


 


 06/29/19 18:17   72  16  174/112  97


 


 06/29/19 17:15   62  18  176/73  96


 


 06/29/19 16:01   60  18  179/74  95











General Appearance: no apparent distress, alert, other (NG tube with blood 

tinged brown to green liquid)


Neurologic Exam: oriented x 3, cooperative


Eye Exam: eyes nml inspection


Ears, Nose, Throat Exam: moist mucous membranes


Neck Exam: normal inspection, non-tender, No lymphadenopathy


Respiratory Exam: normal breath sounds, lungs clear, No crackles/rales, No 

rhonchi, No wheezing


Cardiovascular Exam: regular rate/rhythm, normal heart sounds, No murmur


Gastrointestinal/Abdomen Exam: soft, distention (approx 12x15 cm firm mass in 

the midline), No normal bowel sounds (hyperactive), No tenderness, No guarding, 

No rebound


Back Exam: normal inspection, No rash


Extremity Exam: normal inspection, No pedal edema, No swelling


Skin Exam: warm, dry, other (face with generalized erythema), No rash





Results





- Labs


Lab/Micro Results: 


 Accuchecks











Date                           06/30/19


 


Date                           06/30/19


 


Date                           06/29/19


 


Time                           06:43


 


Time                           00:30


 


Time                           21:30


 


Accucheck Value:               152


 


Accucheck Value:               156


 


Accucheck Value:               147











 Lab Results-Last 24 Hours











  06/29/19 06/29/19 06/29/19 Range/Units





  15:55 16:18 16:18 


 


WBC   5.8   (4.0-10.5)  K/mm3


 


RBC   5.22   (4.1-5.4)  M/mm3


 


Hgb   15.6   (12.0-16.0)  gm/dl


 


Hct   46.8   (35-47)  %


 


MCV   89.7   ()  fl


 


MCH   29.9   (26-32)  pg


 


MCHC   33.3   (32-36)  g/dl


 


RDW   13.7   (11.5-14.0)  %


 


Plt Count   229   (150-450)  K/mm3


 


MPV   10.7 H   (6-9.5)  fl


 


Gran %   62.9   (36.0-66.0)  %


 


Eos # (Auto)   0.10   (0-0.5)  


 


Absolute Lymphs (auto)   1.32   (1.0-4.6)  


 


Absolute Monos (auto)   0.71   (0.0-1.3)  


 


Lymphocytes %   22.7 L   (24.0-44.0)  %


 


Monocytes %   12.2 H   (0.0-12.0)  %


 


Eosinophils %   1.7   (0.00-5.0)  %


 


Basophils %   0.5   (0.0-0.4)  %


 


Absolute Granulocytes   3.66   (1.4-6.9)  


 


Basophils #   0.03   (0-0.4)  


 


Sodium    140  (137-145)  mmol/L


 


Potassium    4.3  (3.5-5.1)  mmol/L


 


Chloride    103  ()  mmol/L


 


Carbon Dioxide    25  (22-30)  mmol/L


 


Anion Gap    16.1 H  (5-15)  MEQ/L


 


BUN    21 H  (7-17)  mg/dL


 


Creatinine    1.39 H  (0.52-1.04)  mg/dL


 


Estimated GFR    39.6  ML/MIN


 


Glucose    148 H  ()  mg/dL


 


Hemoglobin A1c     (4.5-6.0)  %


 


Calcium    10.4 H  (8.4-10.2)  mg/dL


 


Total Bilirubin    0.90  (0.2-1.3)  mg/dL


 


AST    61 H  (14-36)  U/L


 


ALT    60 H  (0-35)  U/L


 


Alkaline Phosphatase    80  ()  U/L


 


Serum Total Protein    7.9  (6.3-8.2)  g/dL


 


Albumin    4.6  (3.5-5.0)  g/dL


 


Amylase    83  ()  U/L


 


Lipase    97  ()  U/L


 


Urine Color  YELLOW    (YELLOW)  


 


Urine Appearance  CLEAR    (CLEAR)  


 


Urine pH  7.0    (5-6)  


 


Ur Specific Gravity  1.009    (1.005-1.025)  


 


Urine Protein  NEGATIVE    (Negative)  


 


Urine Ketones  NEGATIVE    (NEGATIVE)  


 


Urine Blood  NEGATIVE    (0-5)  Raffy/ul


 


Urine Nitrite  NEGATIVE    (NEGATIVE)  


 


Urine Bilirubin  NEGATIVE    (NEGATIVE)  


 


Urine Urobilinogen  NEGATIVE    (0-1)  mg/dL


 


Ur Leukocyte Esterase  NEGATIVE    (NEGATIVE)  


 


Urine WBC (Auto)  NONE    (0-5)  /HPF


 


Urine RBC (Auto)  0-2    (0-2)  /HPF


 


U Epithel Cells (Auto)  NONE    (FEW)  /HPF


 


Urine Bacteria (Auto)  NONE SEEN    (NEGATIVE)  /HPF


 


Urine Mucus (Auto)  SLIGHT    (NEGATIVE)  /HPF


 


Urine Culture Reflexed  NO    (NO)  


 


Urine Glucose  NEGATIVE    (NEGATIVE)  mg/dL














  06/30/19 06/30/19 06/30/19 Range/Units





  05:30 05:30 07:45 


 


WBC  9.0    (4.0-10.5)  K/mm3


 


RBC  5.21    (4.1-5.4)  M/mm3


 


Hgb  15.7    (12.0-16.0)  gm/dl


 


Hct  46.5    (35-47)  %


 


MCV  89.3    ()  fl


 


MCH  30.1    (26-32)  pg


 


MCHC  33.8    (32-36)  g/dl


 


RDW  13.8    (11.5-14.0)  %


 


Plt Count  261    (150-450)  K/mm3


 


MPV  9.6 H    (6-9.5)  fl


 


Gran %  87.6 H    (36.0-66.0)  %


 


Eos # (Auto)  0    (0-0.5)  


 


Absolute Lymphs (auto)  0.65 L    (1.0-4.6)  


 


Absolute Monos (auto)  0.46    (0.0-1.3)  


 


Lymphocytes %  7.2 L    (24.0-44.0)  %


 


Monocytes %  5.1    (0.0-12.0)  %


 


Eosinophils %  0.0    (0.00-5.0)  %


 


Basophils %  0.1    (0.0-0.4)  %


 


Absolute Granulocytes  7.90 H    (1.4-6.9)  


 


Basophils #  0.01    (0-0.4)  


 


Sodium   144   (137-145)  mmol/L


 


Potassium   3.9   (3.5-5.1)  mmol/L


 


Chloride   105   ()  mmol/L


 


Carbon Dioxide   27   (22-30)  mmol/L


 


Anion Gap   15.4 H   (5-15)  MEQ/L


 


BUN   18 H   (7-17)  mg/dL


 


Creatinine   1.24 H   (0.52-1.04)  mg/dL


 


Estimated GFR   45.2   ML/MIN


 


Glucose   152 H   ()  mg/dL


 


Hemoglobin A1c    5.65  (4.5-6.0)  %


 


Calcium   9.7   (8.4-10.2)  mg/dL


 


Total Bilirubin   0.70   (0.2-1.3)  mg/dL


 


AST   28   (14-36)  U/L


 


ALT   49 H   (0-35)  U/L


 


Alkaline Phosphatase   81   ()  U/L


 


Serum Total Protein   6.9   (6.3-8.2)  g/dL


 


Albumin   4.0   (3.5-5.0)  g/dL


 


Amylase     ()  U/L


 


Lipase     ()  U/L


 


Urine Color     (YELLOW)  


 


Urine Appearance     (CLEAR)  


 


Urine pH     (5-6)  


 


Ur Specific Gravity     (1.005-1.025)  


 


Urine Protein     (Negative)  


 


Urine Ketones     (NEGATIVE)  


 


Urine Blood     (0-5)  Raffy/ul


 


Urine Nitrite     (NEGATIVE)  


 


Urine Bilirubin     (NEGATIVE)  


 


Urine Urobilinogen     (0-1)  mg/dL


 


Ur Leukocyte Esterase     (NEGATIVE)  


 


Urine WBC (Auto)     (0-5)  /HPF


 


Urine RBC (Auto)     (0-2)  /HPF


 


U Epithel Cells (Auto)     (FEW)  /HPF


 


Urine Bacteria (Auto)     (NEGATIVE)  /HPF


 


Urine Mucus (Auto)     (NEGATIVE)  /HPF


 


Urine Culture Reflexed     (NO)  


 


Urine Glucose     (NEGATIVE)  mg/dL








 Accuchecks











Date                           06/30/19


 


Date                           06/30/19


 


Date                           06/29/19


 


Time                           06:43


 


Time                           00:30


 


Time                           21:30


 


Accucheck Value:               152


 


Accucheck Value:               156


 


Accucheck Value:               147

















- Radiology Impressions


Radiology Exams & Impressions: 


 Radiology Procedures











 Category Date Time Status


 


 ABDOMEN AND PELVIS W/0 CONTRAS [CT] Stat Exams  06/29/19 16:22 Completed


 


 KUB Stat Exams  06/29/19 19:35 Completed














Assessment/Plan


(1) Small bowel obstruction


Current Visit: Yes   Status: Acute   


Assessment & Plan: 


Dr. Rodas saw the pt here and recommends surgery tomorrow.  However, he 

anticipates, and I agree, that anesthesia would recommend doing the surgery in 

a place with cardiology support due to pt's bradycardia.  I will speak with Dr. Garcia and anticipate transferring pt to  or [facility] this afternoon.


Code(s): K56.609 - UNSP INTESTNL OBST, UNSP AS TO PARTIAL VERSUS COMPLETE OBST 

  





(2) Chronic renal failure


Current Visit: Yes   Status: Acute   


Qualifiers: 


   Chronic kidney disease stage: stage 3 (moderate)   Qualified Code(s): N18.3 

- Chronic kidney disease, stage 3 (moderate)   


Assessment & Plan: 


Renal function much improved since admission.








(3) Physical deconditioning


Current Visit: No   Status: Chronic   


Assessment & Plan: 


may need swing bed stay again after acute stay; discussed with pt.


Code(s): R53.81 - OTHER MALAISE   





(4) Bradycardia


Current Visit: No   Status: Chronic   Code(s): R00.1 - BRADYCARDIA, UNSPECIFIED

   





(5) Hypertension


Current Visit: No   Status: Chronic   


Qualifiers: 


   Hypertension type: essential hypertension   Qualified Code(s): I10 - 

Essential (primary) hypertension   


Assessment & Plan: 


Some elevated BP here; prn hydralazine given.


Code(s): I10 - ESSENTIAL (PRIMARY) HYPERTENSION

## 2019-07-01 NOTE — CONS
CONSULT DATE:  06/30/2019



Dr. Laurent Sadler was on call when the consult came in. He asked that I stop by and round 
on the patient for him. 



HISTORY:  This 73 year-old female has an open cholecystectomy upper midline incision. It 
sounds like by possibly Dr. Laws in the past years ago back in 2003. She had a prior 
hysterectomy lower midline by Dr. Newman in the past. She has a chronic ventral hernia 
upper midline. She has been in the hospital swing-bed previously. Dr. Dangelo Rubio or Dr. Rob Rubio had seen her in the past. Either way she is in the hospital, had some aches and 
pains. She had CT scan that showed ventral hernia and some omentum with some small bowel 
loops. No free air or collection. She is hemodynamically stable. White blood cell count 9, 
hemoglobin 15.7, ,000. She is not exhibiting any signs of any acute ischemia at 
this point but does have chronic hernia that would benefit from repair at some point. 
However unfortunately the patient has had problems with low heart rate and being followed 
by cardiology and is need of cardiac clearance previously or anesthesia would not proceed 
with anesthetic here at this time until cleared per cardiologist. 



PAST MEDICAL HISTORY:  Again, she had some cardiac issues with bradycardia. She has had 
some hyperlipidemia, migraines, hypertension, reflux, and some renal disease in the past. 



PAST SURGICAL HISTORY: Open cholecystectomy at [facility] by another 
surgeon group years ago. She had open hysterectomy by Dr. Newman years ago at [facility]. 



MEDICATIONS:  Includes Pravastatin, Lasix, magnesium oxide, solifenacin succinate, 
Spironolactone, vitamin B2, Pepcid Complete tablet. 



ALLERGIES:  NKDA.



FAMILY HISTORY:  Negative in regards to this problem. 



SOCIAL HISTORY:  Denies smoking or alcohol abuse.

 

REVIEW OF SYSTEMS:  Twelve systems reviewed per admission assessment. No current chest 
pain or palpitations other systems negative or noncontributory as above and per 
preadmission questionnaire. She had some vague abdominal pain that is improved now. She 
has an NG in position. She had a bowel movement yesterday. No bowel movement this morning 
yet. 



PHYSICAL EXAMINATION:  

GENERAL: No acute distress.

HEENT:  Sclera nonicteric.

NECK:  No JVD.

CHEST:  Equal excursion, nonlabored breathing.

CVS:  Regular rate and rhythm.

ABDOMEN: Soft.  She had midline epigastric incision from prior open cholecystectomy. She 
has chronic hernia. 

EXTREMITIES:  No significant edema. 

NEURO:  Alert, moving extremities grossly symmetrically.  No gross motor deficits noted. 



IMPRESSION:  Chronic ventral hernia. No need for emergent surgery. Would benefit from 
repair in the near future but as it is not emergent, I feel she needs cardiac clearance as 
anesthesia would not give her anesthesia given her questionable cardiac status. Therefore 
I spoke with Dr. Shukla. She will transfer to [place] to get cardiac clearance for 
eventual repair at some point probably by Dr. Sadler or one of us pending timing of 
clearance. Otherwise general risk of

bleeding or infection, risk of wound complications, risk of mesh infection possibly 
requiring removal, risk of adhesion or scar formation or obstruction, risk of recurrent 
hernia, risk of hematoma or seroma formation, general risk of aches, pains, burning or 
numbness possibly long term or chronic in nature, small possibility of requiring bowel 
resection or other procedures, rare risk of mesh fracture or failure possibly creating 
issues with viscera or other structures possibly requiring other procedures, general risk 
of anesthesia, deep venous thrombosis, pulmonary embolism, pneumonia, cardiopulmonary 
event but not limited to. 



At this time she needs to transfer for cardiac clearance. I will let Dr. Sadler, as I am 
seeing the patient for him, let her know she is going to transfer up there. Otherwise no 
emergent surgery necessary.

## 2019-07-09 ENCOUNTER — HOSPITAL ENCOUNTER (INPATIENT)
Dept: HOSPITAL 33 - MED SURG | Age: 74
LOS: 17 days | Discharge: HOME HEALTH | DRG: 950 | End: 2019-07-26
Attending: FAMILY MEDICINE | Admitting: FAMILY MEDICINE
Payer: MEDICARE

## 2019-07-09 DIAGNOSIS — R53.81: ICD-10-CM

## 2019-07-09 DIAGNOSIS — Z48.815: Primary | ICD-10-CM

## 2019-07-09 DIAGNOSIS — Z79.899: ICD-10-CM

## 2019-07-09 DIAGNOSIS — R51: ICD-10-CM

## 2019-07-09 DIAGNOSIS — I12.9: ICD-10-CM

## 2019-07-09 DIAGNOSIS — E78.5: ICD-10-CM

## 2019-07-09 DIAGNOSIS — N18.3: ICD-10-CM

## 2019-07-09 PROCEDURE — 85027 COMPLETE CBC AUTOMATED: CPT

## 2019-07-09 PROCEDURE — 36415 COLL VENOUS BLD VENIPUNCTURE: CPT

## 2019-07-09 PROCEDURE — 80048 BASIC METABOLIC PNL TOTAL CA: CPT

## 2019-07-09 RX ADMIN — SIMVASTATIN SCH MG: 20 TABLET, FILM COATED ORAL at 21:10

## 2019-07-09 RX ADMIN — ERGOCALCIFEROL SCH UNIT: 1.25 CAPSULE ORAL at 15:35

## 2019-07-09 RX ADMIN — OXYBUTYNIN CHLORIDE SCH MG: 5 TABLET ORAL at 21:10

## 2019-07-10 RX ADMIN — SPIRONOLACTONE SCH MG: 25 TABLET, FILM COATED ORAL at 08:18

## 2019-07-10 RX ADMIN — PANTOPRAZOLE SODIUM SCH MG: 40 TABLET, DELAYED RELEASE ORAL at 08:18

## 2019-07-10 RX ADMIN — OXYBUTYNIN CHLORIDE SCH MG: 5 TABLET ORAL at 08:18

## 2019-07-10 RX ADMIN — MAGNESIUM OXIDE TAB 400 MG (241.3 MG ELEMENTAL MG) SCH MG: 400 (241.3 MG) TAB at 08:18

## 2019-07-10 RX ADMIN — ACETAMINOPHEN PRN MG: 500 TABLET ORAL at 09:00

## 2019-07-10 RX ADMIN — OXYBUTYNIN CHLORIDE SCH MG: 5 TABLET ORAL at 21:50

## 2019-07-10 RX ADMIN — SIMVASTATIN SCH MG: 20 TABLET, FILM COATED ORAL at 21:50

## 2019-07-10 NOTE — PCM.HP
History of Present Illness





- Chief Complaint


Chief Complaint: DECONDITIONING R/T REPAIR SBO INCARCERATED HERNIA


History of Present Illness: 


 is a 73 year old female pt with PMHx of chronic renal 

insufficiency,hyperlipidemia, and hyperglycemia who was admitted to swing bed 

after having a small bowel resection on 7/3/19.  She had been admitted to [facility] 

then transferred to [facility] for the surgery.  





Currently she is not having any pain.  She did have 1 bloody BM yesterday but 

had a nl BM afterward.  Has trouble lifting her legs and getting out of a 

chair.  Getting PT here.





She would like a bland diet as our regular hospital diet has too many fried 

foods on it.





- Review of Systems


Constitutional: Other (sweats intermittently)


Cardiac: Edema


Genitourinary Symptoms: Other (polyuria)


Skin: Other (bruising after IVs)


Neurological: Headache (occasional), Other (nightmares)


Psychological: No Suicidal Ideations, No Homicidal Ideations


All Other Systems: Reviewed and Negative





Medications & Allergies


Home Medications: 


 Home Medication List





Pravastatin Sodium 40 mg PO DAILY 01/20/15 [History Confirmed 07/09/19]


Magnesium Oxide 400 mg PO DAILY 06/13/19 [History Confirmed 07/09/19]


Solifenacin Succinate 10 mg PO DAILY 06/13/19 [History Confirmed 07/09/19]


Spironolactone 25 mg PO DAILY 06/13/19 [History Confirmed 07/09/19]


Ergocalciferol (Vitamin D2) [Vitamin D2] 50,000 unit PO UD 06/14/19 [History 

Confirmed 07/09/19]


Famotidine/Calcium Carb/Mag [Pepcid Complete Tablet Chew] 1 tab.chew PO BIDPRN 

PRN 06/26/19 [History Confirmed 07/09/19]


Acetaminophen [Tylenol Extra Strength] 500 mg PO Q6HPRN PRN 07/09/19 [History 

Confirmed 07/09/19]


Hydrocodone Bit/Acetaminophen [Hydrocodon-Acetaminophen 5-325] 1 each PO Q4HPRN 

PRN 07/09/19 [History Confirmed 07/09/19]


PANTOPRAZOLE 40 mg Tablet*** [Protonix 40MG Tablet***] 40 mg PO QAM 07/09/19 [

History Confirmed 07/09/19]








Allergies/Adverse Reactions: 


 Allergies











Allergy/AdvReac Type Severity Reaction Status Date / Time


 


Sulfa (Sulfonamide Allergy   Verified 06/29/19 16:01





Antibiotics)     


 


varicella-zoster immune AdvReac Intermediate  Verified 06/29/19 16:01





globulin (h     





[varicella-zoster immune     





glob]     














- Past Medical History


Past Medical History: Yes


Neurological History: Migraines


ENT History: No Pertinent History


Cardiac History: High Cholesterol, Hypertension


Respiratory History: Bronchitis, Other


Endocrine Medical History: No Pertinent History


Musculoskelatal History: No Pertinent History


GI Medical History: GERD, Gallbladder Disease


 History: Renal Disease


Pyscho-Social History: No Pertinent History


Reproductive Disorders: Fibroids, Other





- Past Surgical History


Past Surgical History: Yes


Neuro Surgical History: No Pertinent History


Cardiac History: No Pertinent History


Respiratory Surgery: No Pertinent History


GI Surgical History: Cholecystectomy, Colon Resection, Hernia Repair


Genitourinary Surgical Hx: No Pertinent History


Musculskeletal Surgical Hx: No Pertinent History


Female Surgical History: Hysterectomy


Other Surgical History: TRANSVERSE COLECTOMY 7/3/19





- Social History


Smoking Status: Never smoker


Exposure to second hand smoke: No


Alcohol: None


Drug Use: none





- Physical Exam


Vital Signs: 


 Vital Signs - 24 hr











  Temp Pulse Resp BP Pulse Ox


 


 07/10/19 07:40  99.3 F  65  16  142/67  93 L


 


 07/09/19 20:22  98.5 F  73  16  151/71  96


 


 07/09/19 11:20  97.6 F  75  18  188/99  97


 


 07/09/19 11:05  97.6 F  75  18  188/99  97











General Appearance: no apparent distress, alert, obese


Neurologic Exam: oriented x 3, cooperative, normal mood/affect


Eye Exam: eyes nml inspection


Ears, Nose, Throat Exam: moist mucous membranes


Neck Exam: normal inspection, non-tender, supple, No lymphadenopathy


Respiratory Exam: normal breath sounds, lungs clear, No crackles/rales, No 

rhonchi, No wheezing


Cardiovascular Exam: regular rate/rhythm, normal heart sounds, No murmur


Gastrointestinal/Abdomen Exam: soft, normal bowel sounds, other (midline 

incision with slight bloody oozing.  there are two areas with packing present), 

No tenderness, No distention, No guarding, No rebound


Back Exam: normal inspection, No rash


Extremity Exam: normal inspection, No pedal edema, No swelling


Skin Exam: normal color, warm, dry, No rash





Assessment/Plan


(1) S/P small bowel resection


Current Visit: Yes   Status: Acute   


Assessment & Plan: 


She is pod #7 today.  [place] diet.


Code(s): Z90.49 - ACQUIRED ABSENCE OF OTHER SPECIFIED PARTS OF DIGESTIVE TRACT 

  





(2) Physical deconditioning


Current Visit: No   Status: Chronic   


Assessment & Plan: 


PT here.


Code(s): R53.81 - OTHER MALAISE   





(3) Chronic renal failure


Current Visit: No   Status: Chronic   


Qualifiers: 


   Chronic kidney disease stage: stage 3 (moderate)   Qualified Code(s): N18.3 

- Chronic kidney disease, stage 3 (moderate)

## 2019-07-11 RX ADMIN — PANTOPRAZOLE SODIUM SCH MG: 40 TABLET, DELAYED RELEASE ORAL at 10:38

## 2019-07-11 RX ADMIN — OXYBUTYNIN CHLORIDE SCH MG: 5 TABLET ORAL at 21:25

## 2019-07-11 RX ADMIN — SPIRONOLACTONE SCH MG: 25 TABLET, FILM COATED ORAL at 10:37

## 2019-07-11 RX ADMIN — MAGNESIUM OXIDE TAB 400 MG (241.3 MG ELEMENTAL MG) SCH MG: 400 (241.3 MG) TAB at 10:37

## 2019-07-11 RX ADMIN — OXYBUTYNIN CHLORIDE SCH MG: 5 TABLET ORAL at 10:37

## 2019-07-11 RX ADMIN — SIMVASTATIN SCH MG: 20 TABLET, FILM COATED ORAL at 21:25

## 2019-07-12 RX ADMIN — OXYBUTYNIN CHLORIDE SCH MG: 5 TABLET ORAL at 08:52

## 2019-07-12 RX ADMIN — OXYBUTYNIN CHLORIDE SCH MG: 5 TABLET ORAL at 21:38

## 2019-07-12 RX ADMIN — PANTOPRAZOLE SODIUM SCH MG: 40 TABLET, DELAYED RELEASE ORAL at 08:52

## 2019-07-12 RX ADMIN — SPIRONOLACTONE SCH MG: 25 TABLET, FILM COATED ORAL at 08:52

## 2019-07-12 RX ADMIN — MAGNESIUM OXIDE TAB 400 MG (241.3 MG ELEMENTAL MG) SCH MG: 400 (241.3 MG) TAB at 08:52

## 2019-07-12 RX ADMIN — SIMVASTATIN SCH MG: 20 TABLET, FILM COATED ORAL at 21:37

## 2019-07-13 RX ADMIN — OXYBUTYNIN CHLORIDE SCH MG: 5 TABLET ORAL at 09:54

## 2019-07-13 RX ADMIN — PANTOPRAZOLE SODIUM SCH MG: 40 TABLET, DELAYED RELEASE ORAL at 09:55

## 2019-07-13 RX ADMIN — ACETAMINOPHEN PRN MG: 500 TABLET ORAL at 22:00

## 2019-07-13 RX ADMIN — MAGNESIUM OXIDE TAB 400 MG (241.3 MG ELEMENTAL MG) SCH MG: 400 (241.3 MG) TAB at 09:55

## 2019-07-13 RX ADMIN — SIMVASTATIN SCH MG: 20 TABLET, FILM COATED ORAL at 22:00

## 2019-07-13 RX ADMIN — OXYBUTYNIN CHLORIDE SCH MG: 5 TABLET ORAL at 22:00

## 2019-07-13 RX ADMIN — ACETAMINOPHEN PRN MG: 500 TABLET ORAL at 10:15

## 2019-07-13 RX ADMIN — SPIRONOLACTONE SCH MG: 25 TABLET, FILM COATED ORAL at 09:54

## 2019-07-14 RX ADMIN — MAGNESIUM OXIDE TAB 400 MG (241.3 MG ELEMENTAL MG) SCH MG: 400 (241.3 MG) TAB at 09:17

## 2019-07-14 RX ADMIN — PANTOPRAZOLE SODIUM SCH MG: 40 TABLET, DELAYED RELEASE ORAL at 09:17

## 2019-07-14 RX ADMIN — ACETAMINOPHEN PRN MG: 500 TABLET ORAL at 21:28

## 2019-07-14 RX ADMIN — OXYBUTYNIN CHLORIDE SCH MG: 5 TABLET ORAL at 09:17

## 2019-07-14 RX ADMIN — SIMVASTATIN SCH MG: 20 TABLET, FILM COATED ORAL at 21:28

## 2019-07-14 RX ADMIN — OXYBUTYNIN CHLORIDE SCH MG: 5 TABLET ORAL at 21:28

## 2019-07-14 RX ADMIN — SPIRONOLACTONE SCH MG: 25 TABLET, FILM COATED ORAL at 09:17

## 2019-07-15 LAB
ANION GAP SERPL CALC-SCNC: 11.7 MEQ/L (ref 5–15)
BUN SERPL-MCNC: 13 MG/DL (ref 7–17)
CALCIUM SPEC-MCNC: 9.8 MG/DL (ref 8.4–10.2)
CHLORIDE SERPL-SCNC: 106 MMOL/L (ref 98–107)
CO2 SERPL-SCNC: 25 MMOL/L (ref 22–30)
CREAT SERPL-MCNC: 1.13 MG/DL (ref 0.52–1.04)
GLUCOSE SERPL-MCNC: 144 MG/DL (ref 74–106)
HCT VFR BLD AUTO: 37.7 % (ref 35–47)
HGB BLD-MCNC: 12.2 GM/DL (ref 12–16)
MCH RBC QN AUTO: 29.5 PG (ref 26–32)
MCHC RBC AUTO-ENTMCNC: 32.4 G/DL (ref 32–36)
PLATELET # BLD AUTO: 370 K/MM3 (ref 150–450)
POTASSIUM SERPLBLD-SCNC: 4.2 MMOL/L (ref 3.5–5.1)
RBC # BLD AUTO: 4.13 M/MM3 (ref 4.1–5.4)
SODIUM SERPL-SCNC: 139 MMOL/L (ref 137–145)
WBC # BLD AUTO: 5.2 K/MM3 (ref 4–10.5)

## 2019-07-15 RX ADMIN — SPIRONOLACTONE SCH MG: 25 TABLET, FILM COATED ORAL at 10:31

## 2019-07-15 RX ADMIN — ERGOCALCIFEROL SCH UNIT: 1.25 CAPSULE ORAL at 14:41

## 2019-07-15 RX ADMIN — PANTOPRAZOLE SODIUM SCH MG: 40 TABLET, DELAYED RELEASE ORAL at 10:31

## 2019-07-15 RX ADMIN — MAGNESIUM OXIDE TAB 400 MG (241.3 MG ELEMENTAL MG) SCH MG: 400 (241.3 MG) TAB at 10:31

## 2019-07-15 RX ADMIN — OXYBUTYNIN CHLORIDE SCH MG: 5 TABLET ORAL at 23:25

## 2019-07-15 RX ADMIN — SIMVASTATIN SCH MG: 20 TABLET, FILM COATED ORAL at 23:24

## 2019-07-15 RX ADMIN — OXYBUTYNIN CHLORIDE SCH MG: 5 TABLET ORAL at 10:31

## 2019-07-15 NOTE — PCM.NOTE
Date and Time: 07/15/19  0839





Subjective Assessment: 





Pt is getting up out of bed and walking in the room with no issues.  She is 

tolerating bland diet.  Still having some intermittent abd pain that she 

characterizes as "nerve endings."  She is nervous about being home by herself 

after discharge and being able to change her own dressings.





- Review of Systems


Constitutional: No Fever


Abdominal/Gastrointestinal: No Abdominal Pain





Objective Exam


General Appearance: no apparent distress, alert


Neurologic Exam: oriented x 3, cooperative, normal mood/affect


Skin Exam: normal color, warm, dry, No rash


Wound Assessment: 


 Skin/Wound Assessment





Wound/Incision Assessment                                  Start:  07/11/19 11:

24


Text:                                                      Status: Active      

  


Freq:   Q4H                                                                    

  


Protocol:                                                                      

  


 Document     07/15/19 04:00  MG  (Rec: 07/15/19 07:45  MG  [id number])


 Wound/Incision Assessment


     Anterior Medial Abdomen


      Wound Assessment                           Shift Assessment


      Wound Type                                 Incision


      Wound Stage                                Non Pressure Wound


      Dressing Status                            Dry & Intact


      Drainage Amount                            None


      Drainage Odor                              None/Absent


      Primary Dressing                           ABD Pad in place


      Comment                                    Incision covered by ABD pad,


                                                 dressing CDI, no change noted








Respiratory Exam: normal breath sounds, lungs clear, No crackles/rales, No 

rhonchi


Cardiovascular Exam: regular rate/rhythm, normal heart sounds, No murmur


Gastrointestinal/Abdomen Exam: soft, normal bowel sounds, other (midline 

incision with small amt serosanguinous drainage inferiorly.  no erythema/exudate

), No tenderness


Extremity Exam: normal inspection, No pedal edema, No swelling


Back Exam: normal inspection, No rash





OBJECTIVE DATA


Vital Signs: 


 Vital Signs - 24 hr











  Temp Pulse Resp BP Pulse Ox


 


 07/15/19 07:34  98.2 F  62  18  136/81  94 L


 


 07/14/19 19:54  98.3 F  68  18  129/70  93 L








 Pain Assessment - Last Documented











Pain Intensity                 4


 


[facility]                [facility]











Intake and Output: 


 Intake & Output











 07/12/19 07/13/19 07/14/19 07/15/19





 11:59 11:59 11:59 11:59


 


Intake Total 1360 480 820 480


 


Output Total   300 


 


Balance 1360 480 520 480


 


Weight 86.5 kg   














Assessment/Plan


(1) S/P small bowel resection


Current Visit: Yes   Status: Acute   


Assessment & Plan: 


POD #12.  She is scheduled to f/u with surgery in 3d so will likely just keep 

her until then and have surgery see her here.


Code(s): Z90.49 - ACQUIRED ABSENCE OF OTHER SPECIFIED PARTS OF DIGESTIVE TRACT 

  





(2) Physical deconditioning


Current Visit: No   Status: Chronic   


Assessment & Plan: 


continued PT, thank you.


Code(s): R53.81 - OTHER MALAISE   





(3) Chronic renal failure


Current Visit: No   Status: Chronic   


Qualifiers: 


   Chronic kidney disease stage: stage 3 (moderate)   Qualified Code(s): N18.3 

- Chronic kidney disease, stage 3 (moderate)   


Assessment & Plan: 


recheck

## 2019-07-16 RX ADMIN — SPIRONOLACTONE SCH MG: 25 TABLET, FILM COATED ORAL at 09:47

## 2019-07-16 RX ADMIN — MAGNESIUM OXIDE TAB 400 MG (241.3 MG ELEMENTAL MG) SCH MG: 400 (241.3 MG) TAB at 09:47

## 2019-07-16 RX ADMIN — SIMVASTATIN SCH MG: 20 TABLET, FILM COATED ORAL at 21:20

## 2019-07-16 RX ADMIN — PANTOPRAZOLE SODIUM SCH MG: 40 TABLET, DELAYED RELEASE ORAL at 09:48

## 2019-07-16 RX ADMIN — OXYBUTYNIN CHLORIDE SCH MG: 5 TABLET ORAL at 09:48

## 2019-07-16 RX ADMIN — OXYBUTYNIN CHLORIDE SCH MG: 5 TABLET ORAL at 21:20

## 2019-07-17 RX ADMIN — OXYBUTYNIN CHLORIDE SCH MG: 5 TABLET ORAL at 20:20

## 2019-07-17 RX ADMIN — PANTOPRAZOLE SODIUM SCH MG: 40 TABLET, DELAYED RELEASE ORAL at 10:31

## 2019-07-17 RX ADMIN — SIMVASTATIN SCH MG: 20 TABLET, FILM COATED ORAL at 20:20

## 2019-07-17 RX ADMIN — SPIRONOLACTONE SCH MG: 25 TABLET, FILM COATED ORAL at 10:32

## 2019-07-17 RX ADMIN — ACETAMINOPHEN PRN MG: 500 TABLET ORAL at 10:31

## 2019-07-17 RX ADMIN — MAGNESIUM OXIDE TAB 400 MG (241.3 MG ELEMENTAL MG) SCH MG: 400 (241.3 MG) TAB at 10:31

## 2019-07-17 RX ADMIN — OXYBUTYNIN CHLORIDE SCH MG: 5 TABLET ORAL at 10:31

## 2019-07-18 RX ADMIN — SIMVASTATIN SCH MG: 20 TABLET, FILM COATED ORAL at 21:08

## 2019-07-18 RX ADMIN — PANTOPRAZOLE SODIUM SCH MG: 40 TABLET, DELAYED RELEASE ORAL at 10:24

## 2019-07-18 RX ADMIN — MAGNESIUM OXIDE TAB 400 MG (241.3 MG ELEMENTAL MG) SCH MG: 400 (241.3 MG) TAB at 10:24

## 2019-07-18 RX ADMIN — ACETAMINOPHEN PRN MG: 500 TABLET ORAL at 21:25

## 2019-07-18 RX ADMIN — OXYBUTYNIN CHLORIDE SCH MG: 5 TABLET ORAL at 10:24

## 2019-07-18 RX ADMIN — OXYBUTYNIN CHLORIDE SCH MG: 5 TABLET ORAL at 21:08

## 2019-07-18 RX ADMIN — SPIRONOLACTONE SCH MG: 25 TABLET, FILM COATED ORAL at 10:24

## 2019-07-18 NOTE — PROG NOTE
HISTORY: She is in the rehab unit. She is about two weeks from herniorrhaphy with 
resection of the large bowel. She is doing well, incision is doing well. There is just one 
small drop of fluid. Her stitches are in place. Her abdomen is soft. She is eating. Her 
bowels are moving. Her abdominal binder in place. She is getting rehab. She is making 
progress satisfactory.

## 2019-07-19 LAB
ANION GAP SERPL CALC-SCNC: 13.9 MEQ/L (ref 5–15)
BUN SERPL-MCNC: 17 MG/DL (ref 7–17)
CALCIUM SPEC-MCNC: 9.9 MG/DL (ref 8.4–10.2)
CHLORIDE SERPL-SCNC: 104 MMOL/L (ref 98–107)
CO2 SERPL-SCNC: 24 MMOL/L (ref 22–30)
CREAT SERPL-MCNC: 1.26 MG/DL (ref 0.52–1.04)
GLUCOSE SERPL-MCNC: 169 MG/DL (ref 74–106)
HCT VFR BLD AUTO: 41 % (ref 35–47)
HGB BLD-MCNC: 13.3 GM/DL (ref 12–16)
MCH RBC QN AUTO: 29.5 PG (ref 26–32)
MCHC RBC AUTO-ENTMCNC: 32.4 G/DL (ref 32–36)
PLATELET # BLD AUTO: 379 K/MM3 (ref 150–450)
POTASSIUM SERPLBLD-SCNC: 4 MMOL/L (ref 3.5–5.1)
RBC # BLD AUTO: 4.51 M/MM3 (ref 4.1–5.4)
SODIUM SERPL-SCNC: 138 MMOL/L (ref 137–145)
WBC # BLD AUTO: 9.3 K/MM3 (ref 4–10.5)

## 2019-07-19 RX ADMIN — SPIRONOLACTONE SCH MG: 25 TABLET, FILM COATED ORAL at 11:05

## 2019-07-19 RX ADMIN — SIMVASTATIN SCH MG: 20 TABLET, FILM COATED ORAL at 21:02

## 2019-07-19 RX ADMIN — OXYBUTYNIN CHLORIDE SCH MG: 5 TABLET ORAL at 21:02

## 2019-07-19 RX ADMIN — PANTOPRAZOLE SODIUM SCH MG: 40 TABLET, DELAYED RELEASE ORAL at 11:05

## 2019-07-19 RX ADMIN — OXYBUTYNIN CHLORIDE SCH MG: 5 TABLET ORAL at 11:05

## 2019-07-19 RX ADMIN — MAGNESIUM OXIDE TAB 400 MG (241.3 MG ELEMENTAL MG) SCH MG: 400 (241.3 MG) TAB at 11:05

## 2019-07-19 NOTE — PCM.NOTE
Date and Time: 07/19/19  1800





Subjective Assessment: 


Late entry for encounter done 7/19/19 at approx 0830 a.m.


Pt "just feels lousy" today.  Did tolerate her breakfast.  Was seen by Dr. Rubio yesterday.





Objective Exam


General Appearance: no apparent distress, alert


Neurologic Exam: oriented x 3, cooperative


Skin Exam: normal color, warm, dry, No rash


Wound Assessment: 


 Skin/Wound Assessment





Wound/Incision Assessment                                  Start:  07/11/19 11:

24


Text:                                                      Status: Active      

  


Freq:   Q4H                                                                    

  


Protocol:                                                                      

  


 Document     07/19/19 14:00  AS  (Rec: 07/19/19 14:11  AS  [id number])


 Wound/Incision Assessment


     Anterior Medial Abdomen


      Wound Assessment                           Shift Assessment


      Wound Type                                 Incision


      Wound Stage                                Non Pressure Wound


      Dressing Status                            Dry & Intact


      Drainage Amount                            None


      Drainage Description                       Serous


      Drainage Odor                              None/Absent


      General Appearance                         Well Approximated


      Primary Dressing                           Absorbant Pad


      Secondary Dressing                         Absorbant Pad


     Left Upper Abdomen


      Drain Type                                 MIRA drain


      Drainage Description                       Sanguineous


      Odor                                       None/Absent


 Wound Photo


     Photo Taken                                 No








Respiratory Exam: normal breath sounds, lungs clear, No crackles/rales, No 

rhonchi, No wheezing


Cardiovascular Exam: regular rate/rhythm, normal heart sounds, No murmur


Gastrointestinal/Abdomen Exam: soft, normal bowel sounds, tenderness (diffuse), 

other (dressing clean and dry.  incision is intact.  there is scant 

serosanguinous drainage from inferior portion of incision.), No distention, No 

mass, No guarding


Extremity Exam: normal inspection, No pedal edema, No swelling


Back Exam: normal inspection, No rash





OBJECTIVE DATA


Vital Signs: 


 Vital Signs - 24 hr











  Temp Pulse Resp BP Pulse Ox


 


 07/19/19 07:28  99.4 F  89  18  119/56  93 L


 


 07/18/19 19:52  98.0 F  73  19  172/62  96








 Pain Assessment - Last Documented











Pain Intensity                 0


 


Pain Scale Used                0-10 Pain Scale











Intake and Output: 


 Intake & Output











 07/17/19 07/18/19 07/19/19 07/20/19





 11:59 11:59 11:59 11:59


 


Intake Total 860 360 540 360


 


Balance 860 360 540 360











Lab Results: 


 Lab Results-Last 24 Hours











  07/19/19 07/19/19 Range/Units





  08:50 08:50 


 


WBC  9.3   (4.0-10.5)  K/mm3


 


RBC  4.51   (4.1-5.4)  M/mm3


 


Hgb  13.3   (12.0-16.0)  gm/dl


 


Hct  41.0   (35-47)  %


 


MCV  90.9   ()  fl


 


MCH  29.5   (26-32)  pg


 


MCHC  32.4   (32-36)  g/dl


 


RDW  14.0   (11.5-14.0)  %


 


Plt Count  379   (150-450)  K/mm3


 


MPV  9.3   (6-9.5)  fl


 


Sodium   138  (137-145)  mmol/L


 


Potassium   4.0  (3.5-5.1)  mmol/L


 


Chloride   104  ()  mmol/L


 


Carbon Dioxide   24  (22-30)  mmol/L


 


Anion Gap   13.9  (5-15)  MEQ/L


 


BUN   17  (7-17)  mg/dL


 


Creatinine   1.26 H  (0.52-1.04)  mg/dL


 


Estimated GFR   44.4  ML/MIN


 


Glucose   169 H  ()  mg/dL


 


Calcium   9.9  (8.4-10.2)  mg/dL











Multi-Disciplinary Progress Notes: 


 Multi-Disciplinary Progress Notes





07/19/19 17:28 Physical Therapy Note by Francine Woods


PT. REPORTS SOME BILATERAL LATERAL LE "ACHING" LAST NIGHT WHILE IN BED.  STILL 

REPORTS SOME LATERAL TRUNK PN AS WELL.  PT. AMBULATED 700' WITHOUT A.D. W/ 1# 

CUFF WEIGHTS BILATERAL ANKLES WITHOUT REST, INSTABILITY, OR SOB.  PT ABLE TO 

PERFORM 15 REPS OF GENERAL STRENGTHENING W/ 1# CUFF WEIGHT WITHOUT DIFFICULTY.  

SIT TO STAND PERFORMED W/ UE SUPPORT FROM THERAPY MAT WITHOUT ASSIST.  PT. 

TOLERATES RX WELL AND HAS PROGRESSED VERY WELL TOWARD REHAB GOALS AND SHOULD BE 

READY FOR D/C HOME SOON W/ ASSIST OF [facility] FOR DRSG MG'T OF ABDOMINAL INCISION.








FRANCINE WOODS PT





Initialized on 07/19/19 17:28 - END OF NOTE

















Assessment/Plan


(1) S/P small bowel resection


Current Visit: Yes   Status: Acute   


Assessment & Plan: 


Recovering well.  Per surgery, thank you.


Code(s): Z90.49 - ACQUIRED ABSENCE OF OTHER SPECIFIED PARTS OF DIGESTIVE TRACT 

  





(2) Physical deconditioning


Current Visit: No   Status: Chronic   


Assessment & Plan: 


Has been doing PT.  May be ready to d/c home some time next week.


Code(s): R53.81 - OTHER MALAISE   





(3) Chronic renal failure


Current Visit: No   Status: Chronic   


Qualifiers: 


   Chronic kidney disease stage: stage 3 (moderate)   Qualified Code(s): N18.3 

- Chronic kidney disease, stage 3 (moderate)   


Assessment & Plan: 


recheck labs

## 2019-07-20 RX ADMIN — MAGNESIUM OXIDE TAB 400 MG (241.3 MG ELEMENTAL MG) SCH MG: 400 (241.3 MG) TAB at 09:31

## 2019-07-20 RX ADMIN — OXYBUTYNIN CHLORIDE SCH MG: 5 TABLET ORAL at 21:35

## 2019-07-20 RX ADMIN — OXYBUTYNIN CHLORIDE SCH MG: 5 TABLET ORAL at 09:30

## 2019-07-20 RX ADMIN — SPIRONOLACTONE SCH MG: 25 TABLET, FILM COATED ORAL at 09:30

## 2019-07-20 RX ADMIN — DOCUSATE SODIUM PRN MG: 100 CAPSULE, LIQUID FILLED ORAL at 17:55

## 2019-07-20 RX ADMIN — PANTOPRAZOLE SODIUM SCH MG: 40 TABLET, DELAYED RELEASE ORAL at 09:31

## 2019-07-20 RX ADMIN — SIMVASTATIN SCH MG: 20 TABLET, FILM COATED ORAL at 21:35

## 2019-07-21 RX ADMIN — DOCUSATE SODIUM PRN MG: 100 CAPSULE, LIQUID FILLED ORAL at 20:34

## 2019-07-21 RX ADMIN — OXYBUTYNIN CHLORIDE SCH MG: 5 TABLET ORAL at 09:29

## 2019-07-21 RX ADMIN — PANTOPRAZOLE SODIUM SCH MG: 40 TABLET, DELAYED RELEASE ORAL at 09:29

## 2019-07-21 RX ADMIN — SPIRONOLACTONE SCH MG: 25 TABLET, FILM COATED ORAL at 09:29

## 2019-07-21 RX ADMIN — MAGNESIUM OXIDE TAB 400 MG (241.3 MG ELEMENTAL MG) SCH MG: 400 (241.3 MG) TAB at 09:29

## 2019-07-21 RX ADMIN — OXYBUTYNIN CHLORIDE SCH MG: 5 TABLET ORAL at 20:32

## 2019-07-21 RX ADMIN — DOCUSATE SODIUM PRN MG: 100 CAPSULE, LIQUID FILLED ORAL at 09:29

## 2019-07-21 RX ADMIN — SIMVASTATIN SCH MG: 20 TABLET, FILM COATED ORAL at 20:32

## 2019-07-22 RX ADMIN — DOCUSATE SODIUM PRN MG: 100 CAPSULE, LIQUID FILLED ORAL at 21:47

## 2019-07-22 RX ADMIN — OXYBUTYNIN CHLORIDE SCH MG: 5 TABLET ORAL at 09:22

## 2019-07-22 RX ADMIN — SIMVASTATIN SCH MG: 20 TABLET, FILM COATED ORAL at 21:45

## 2019-07-22 RX ADMIN — PANTOPRAZOLE SODIUM SCH MG: 40 TABLET, DELAYED RELEASE ORAL at 09:22

## 2019-07-22 RX ADMIN — MAGNESIUM OXIDE TAB 400 MG (241.3 MG ELEMENTAL MG) SCH MG: 400 (241.3 MG) TAB at 09:22

## 2019-07-22 RX ADMIN — SPIRONOLACTONE SCH MG: 25 TABLET, FILM COATED ORAL at 09:22

## 2019-07-22 RX ADMIN — ERGOCALCIFEROL SCH UNIT: 1.25 CAPSULE ORAL at 16:41

## 2019-07-22 RX ADMIN — POLYETHYLENE GLYCOL 3350 PRN GM: 17 POWDER, FOR SOLUTION ORAL at 10:07

## 2019-07-22 RX ADMIN — OXYBUTYNIN CHLORIDE SCH MG: 5 TABLET ORAL at 21:45

## 2019-07-23 LAB
ANION GAP SERPL CALC-SCNC: 13.6 MEQ/L (ref 5–15)
BUN SERPL-MCNC: 15 MG/DL (ref 7–17)
CALCIUM SPEC-MCNC: 10 MG/DL (ref 8.4–10.2)
CHLORIDE SERPL-SCNC: 104 MMOL/L (ref 98–107)
CO2 SERPL-SCNC: 25 MMOL/L (ref 22–30)
CREAT SERPL-MCNC: 1.29 MG/DL (ref 0.52–1.04)
GLUCOSE SERPL-MCNC: 104 MG/DL (ref 74–106)
POTASSIUM SERPLBLD-SCNC: 4.1 MMOL/L (ref 3.5–5.1)
SODIUM SERPL-SCNC: 138 MMOL/L (ref 137–145)

## 2019-07-23 RX ADMIN — OXYBUTYNIN CHLORIDE SCH MG: 5 TABLET ORAL at 22:09

## 2019-07-23 RX ADMIN — SPIRONOLACTONE SCH MG: 25 TABLET, FILM COATED ORAL at 09:58

## 2019-07-23 RX ADMIN — OXYBUTYNIN CHLORIDE SCH MG: 5 TABLET ORAL at 09:58

## 2019-07-23 RX ADMIN — MAGNESIUM OXIDE TAB 400 MG (241.3 MG ELEMENTAL MG) SCH MG: 400 (241.3 MG) TAB at 09:58

## 2019-07-23 RX ADMIN — SIMVASTATIN SCH MG: 20 TABLET, FILM COATED ORAL at 22:09

## 2019-07-23 RX ADMIN — PANTOPRAZOLE SODIUM SCH MG: 40 TABLET, DELAYED RELEASE ORAL at 09:58

## 2019-07-24 RX ADMIN — HYDROCODONE BITARTRATE AND ACETAMINOPHEN PRN TAB: 5; 325 TABLET ORAL at 22:05

## 2019-07-24 RX ADMIN — OXYBUTYNIN CHLORIDE SCH MG: 5 TABLET ORAL at 10:25

## 2019-07-24 RX ADMIN — MAGNESIUM OXIDE TAB 400 MG (241.3 MG ELEMENTAL MG) SCH MG: 400 (241.3 MG) TAB at 10:25

## 2019-07-24 RX ADMIN — OXYBUTYNIN CHLORIDE SCH MG: 5 TABLET ORAL at 22:04

## 2019-07-24 RX ADMIN — PANTOPRAZOLE SODIUM SCH MG: 40 TABLET, DELAYED RELEASE ORAL at 10:25

## 2019-07-24 RX ADMIN — SIMVASTATIN SCH MG: 20 TABLET, FILM COATED ORAL at 22:05

## 2019-07-24 RX ADMIN — SPIRONOLACTONE SCH MG: 25 TABLET, FILM COATED ORAL at 10:25

## 2019-07-24 NOTE — PCM.NOTE
Date and Time: 07/24/19 0916





Subjective Assessment: 





Pt was constipated but relieved with miralax. Eating small amounts of bland 

diet.  Working with PT.  She is feeling better about her incision as she can 

see it's healing.





- Review of Systems


Constitutional: No Fever


Abdominal/Gastrointestinal: No Vomiting





Objective Exam


General Appearance: no apparent distress, alert


Neurologic Exam: oriented x 3, cooperative


Skin Exam: normal color, warm, dry, No rash


Wound Assessment: 


 Skin/Wound Assessment





Wound/Incision Assessment                                  Start:  07/11/19 11:

24


Text:                                                      Status: Active      

  


Freq:   Q4H                                                                    

  


Protocol:                                                                      

  


 Document     07/24/19 06:00  MG  (Rec: 07/24/19 07:28  MG  [id number])


 Wound/Incision Assessment


     Anterior Medial Abdomen


      Wound Assessment                           Shift Assessment


      Wound Type                                 Incision


      Wound Stage                                Non Pressure Wound


      Dressing Status                            Dry & Intact


      Drainage Amount                            None


      Drainage Odor                              None/Absent


      Primary Dressing                           Non-Adherent Gauze Pads


      Secondary Dressing                         Absorbant Pad


      Comment                                    Dressing remains CDI.








Ears, Nose, Throat Exam: moist mucous membranes


Respiratory Exam: normal breath sounds, lungs clear, No crackles/rales, No 

rhonchi, No wheezing


Cardiovascular Exam: regular rate/rhythm, normal heart sounds, No murmur


Gastrointestinal/Abdomen Exam: soft, normal bowel sounds, other (midline 

incision healing well; 4-5 sutures noted in place.  Inferiorly there is stilla 

small amt of serous drainage. no erythema or exudate, no induration.), No 

tenderness, No distention, No mass, No guarding, No rebound





OBJECTIVE DATA


Vital Signs: 


 Vital Signs - 24 hr











  Temp Pulse Resp BP Pulse Ox


 


 07/24/19 07:26  98.2 F  70  18  125/59  97


 


 07/23/19 20:00  98.3 F  66  18  129/76  96








 Pain Assessment - Last Documented











Pain Intensity                 0


 


Pain Scale Used                0-10 Pain Scale











Intake and Output: 


 Intake & Output











 07/21/19 07/22/19 07/23/19 07/24/19





 11:59 11:59 11:59 11:59


 


Intake Total  720


 


Balance  720


 


Weight   82.2 kg 











Multi-Disciplinary Progress Notes: 


 Multi-Disciplinary Progress Notes





07/23/19 10:00 Case Management Note by Mooring,Renée


SPOKE WITH PT ABOUT DC NEEDS. SHE HOPES TO GO HOME TOMORROW WITH [facility]. 

REFERRAL MADE. 





Initialized on 07/23/19 10:00 - END OF NOTE

















Assessment/Plan


(1) S/P small bowel resection


Current Visit: Yes   Status: Acute   


Assessment & Plan: 


Her incision looks great - RN to check with surgery today regarding timing of 

suture removal.


Code(s): Z90.49 - ACQUIRED ABSENCE OF OTHER SPECIFIED PARTS OF DIGESTIVE TRACT 

  





(2) Physical deconditioning


Current Visit: No   Status: Chronic   


Assessment & Plan: 


PT


Code(s): R53.81 - OTHER MALAISE   





(3) Chronic renal failure


Current Visit: No   Status: Chronic   


Qualifiers: 


   Chronic kidney disease stage: stage 3 (moderate)   Qualified Code(s): N18.3 

- Chronic kidney disease, stage 3 (moderate)

## 2019-07-25 RX ADMIN — POLYETHYLENE GLYCOL 3350 PRN GM: 17 POWDER, FOR SOLUTION ORAL at 08:16

## 2019-07-25 RX ADMIN — MAGNESIUM OXIDE TAB 400 MG (241.3 MG ELEMENTAL MG) SCH MG: 400 (241.3 MG) TAB at 08:15

## 2019-07-25 RX ADMIN — OXYBUTYNIN CHLORIDE SCH MG: 5 TABLET ORAL at 08:15

## 2019-07-25 RX ADMIN — OXYBUTYNIN CHLORIDE SCH MG: 5 TABLET ORAL at 21:51

## 2019-07-25 RX ADMIN — SPIRONOLACTONE SCH MG: 25 TABLET, FILM COATED ORAL at 08:15

## 2019-07-25 RX ADMIN — HYDROCODONE BITARTRATE AND ACETAMINOPHEN PRN TAB: 5; 325 TABLET ORAL at 21:52

## 2019-07-25 RX ADMIN — PANTOPRAZOLE SODIUM SCH MG: 40 TABLET, DELAYED RELEASE ORAL at 08:15

## 2019-07-25 RX ADMIN — SIMVASTATIN SCH MG: 20 TABLET, FILM COATED ORAL at 21:51

## 2019-07-26 VITALS — OXYGEN SATURATION: 95 % | DIASTOLIC BLOOD PRESSURE: 75 MMHG | HEART RATE: 62 BPM | SYSTOLIC BLOOD PRESSURE: 123 MMHG

## 2019-07-26 RX ADMIN — SPIRONOLACTONE SCH MG: 25 TABLET, FILM COATED ORAL at 10:13

## 2019-07-26 RX ADMIN — MAGNESIUM OXIDE TAB 400 MG (241.3 MG ELEMENTAL MG) SCH MG: 400 (241.3 MG) TAB at 10:12

## 2019-07-26 RX ADMIN — PANTOPRAZOLE SODIUM SCH MG: 40 TABLET, DELAYED RELEASE ORAL at 10:13

## 2019-07-26 RX ADMIN — OXYBUTYNIN CHLORIDE SCH MG: 5 TABLET ORAL at 10:13

## 2019-07-26 NOTE — PCM.DS
Discharge Summary


Date of Admission: 


07/09/19 11:04





Admitting Physician: 


NIKKI RIZZO





Primary Care Provider: 


NIKKI RIZZO








Allergies


Allergies





Sulfa (Sulfonamide Antibiotics) Allergy (Verified 06/29/19 16:01)


 


varicella-zoster immune globulin (h [varicella-zoster immune glob] Adverse 

Reaction (Intermediate, Verified 06/29/19 16:01)


 


 rash and sore bones 











Hospital Summary





- Hospital Course


Hospital Course: 





 is a 73 year old female pt with PMHx of chronic renal 

insufficiency,hyperlipidemia, and hyperglycemia who was admitted to [facility] 

after having a small bowel resection on 7/3/19.  She had been admitted to [facility] 

then transferred to [facility] for the surgery.  





She had one bloody BM after admission but none since.  Has had some 

constipation which is relieved with miralax. 





She has been anxious about her incision.  However the sutures have been removed 

and it is healing well.


She did have an appointment with surgery last week and was seen here instead.





Her therapy appears to be progressing very well.





She will be discharged to home today - f/u with me in1 week and surgery as 

ordered.





- Vitals & Intake/Output


Vital Signs: 





 Vital Signs











Temperature  98.6 F   07/26/19 07:58


 


Pulse Rate  62   07/26/19 07:58


 


Respiratory Rate  18   07/26/19 07:58


 


Blood Pressure  123/75   07/26/19 07:58


 


O2 Sat by Pulse Oximetry  95   07/26/19 07:58











Intake & Output: 





 Intake & Output











 07/23/19 07/24/19 07/25/19 07/26/19





 11:59 11:59 11:59 11:59


 


Intake Total 9387 160 0291 600


 


Balance 6635 795 9492 600


 


Weight 82.2 kg   














- Lab


Result Diagrams: 


 07/19/19 08:50





 07/23/19 05:58





- Procedures and Test


Procedures and Tests throughout Hospitalization: 





 Therapy Orders & Screens





07/09/19 10:51


PT Eval & Treat (MD Order) ROUTINE 


   Reason for Eval:: DECONDITIONING RT TRANSVERSE COLECTOMY


   Diagnosis: DECONDITIONING RT COLECTOMY





07/09/19 10:52


PT Eval & Treat (MD Order) ROUTINE 


   Reason for Eval:: DECONDITIONING R/T REPAIR OF SBO, INCARCERATED HERNIA


   Diagnosis: DECONDITIONING R/T REPAIR SBO INCARCERATED HERNIA





07/09/19 11:29


OT Screen per Nursing Assess ONCE 


   Comment: Protocol Order


   Physician Instructions: Greater than 3 points order OT Admission Screening


   Reason For Exam: Triggered on Admission


   Diagnosis: DECONDITIONING R/T REPAIR SBO INCARCERATED HERNIA


   Open Wound/Cellutlitis/Pressure Ulcers: Yes


   Acute Fx/ORIF/Change in wt bearing status: No


   Severe MUSCULOSKELETAL pain: No


   ADL Dysfunction: No


   Acute CVA w/Hemiparesis/Hemiplegia: No


   Decreased Functional Mobility/Strength: Yes


   Sprain/Strain: No


   Acute Post-op Mobility Dysfunction: No


   Total Points: 6


PT Screen per Nursing Assess ONCE 


   Comment: Protocol Order


   Physician Instructions: Greater than 3 points order PT Admission Screenin


   Reason For Exam: Triggered on Admission


   Diagnosis: DECONDITIONING R/T REPAIR SBO INCARCERATED HERNIA


   Open Wound/Cellutlitis/Pressure Ulcers: Yes


   Acute Fx/ORIF/Change in wt bearing status: No


   Severe MUSCULOSKELETAL pain: No


   ADL Dysfunction: No


   Acute CVA w/Hemiparesis/Hemiplegia: No


   Decreased Functional Mobility/Strength: Yes


   Sprain/Strain: No


   Acute Post-op Mobility Dysfunction: No


   Total Points: 6














Discharge Exam


General Appearance: no apparent distress, alert


Neurologic Exam: oriented x 3, cooperative


Eye Exam: eyes nml inspection


Ears, Nose, Throat Exam: moist mucous membranes


Neck Exam: normal inspection


Respiratory Exam: normal breath sounds, lungs clear, No crackles/rales, No 

rhonchi, No wheezing


Cardiovascular Exam: regular rate/rhythm, normal heart sounds, No murmur


Gastrointestinal/Abdomen Exam: soft, normal bowel sounds, other (midline 

incision healing well; no erythema or exudate), No tenderness, No distention, 

No mass, No guarding, No rebound


Extremity Exam: normal inspection, No pedal edema, No swelling


Skin Exam: normal color, warm, dry, No rash


Wound Assessment: 





 Skin/Wound Assessment





Wound/Incision Assessment                                  Start:  07/11/19 11:

24


Text:                                                      Status: Active      

  


Freq:   Q4H                                                                    

  


Protocol:                                                                      

  


 Document     07/26/19 06:00  MG  (Rec: 07/26/19 08:04  MG  [id number])


 Wound/Incision Assessment


     Anterior Medial Abdomen


      Wound Assessment                           Shift Assessment


      Wound Type                                 Incision


      Wound Stage                                Non Pressure Wound


      Dressing Status                            Dry & Intact


      Drainage Amount                            Minimal


      Drainage Description                       Serous


      Drainage Odor                              None/Absent


      General Appearance                         Well Approximated


      Primary Dressing                           Absorbant Pad


      Comment                                    Small amount of serous


                                                 drainage on dressing, no


                                                 change noted.











Final Diagnosis/Problem List





- Final Discharge Diagnosis/Problem


(1) S/P small bowel resection


Status: Acute   


Assessment & Plan: 


doing great. F/u with surgery outpatient as ordered. 


Code(s): Z90.49 - ACQUIRED ABSENCE OF OTHER SPECIFIED PARTS OF DIGESTIVE TRACT 

  





(2) Physical deconditioning


Status: Chronic   


Assessment & Plan: 


much improved.  Home with home health.  


Code(s): R53.81 - OTHER MALAISE   





(3) Chronic renal failure


Status: Chronic   


Assessment & Plan: 


continue getting regular labs. recheck next week when she follows up in office.








- Discharge


Disposition: HOME HEALTH SERVICE


Condition: Stable


Prescriptions: 


Continue


   Pravastatin Sodium 40 mg PO DAILY


   Solifenacin Succinate 10 mg PO DAILY


   Magnesium Oxide 400 mg PO DAILY


   Spironolactone 25 mg PO DAILY


   Ergocalciferol (Vitamin D2) [Vitamin D2] 50,000 unit PO UD


   Famotidine/Calcium Carb/Mag [Pepcid Complete Tablet Chew] 1 tab.chew PO 

BIDPRN PRN


     PRN Reason: Stomach Upset


   PANTOPRAZOLE 40 mg Tablet*** [Protonix 40MG Tablet***] 40 mg PO QAM


   Hydrocodone Bit/Acetaminophen [Hydrocodon-Acetaminophen 5-325] 1 each PO 

Q4HPRN PRN


     PRN Reason: Pain


   Acetaminophen [Tylenol Extra Strength] 500 mg PO Q6HPRN PRN


     PRN Reason: Pain


Instructions:  Small Bowel Resection, Surgical Wound (DC)


Additional Instructions: 


[facility] WILL CONTACT YOU TO ARRANGE YOUR FIRST VISIT. YOU 

MAY CALL THEM [phone] FOR ANY NEEDS. 


Follow up with: 


RUFUS SWENSON [CONSULTING PHYSICIAN] - 07/31/19 11:00 am


VIKKI WALKER [ACTIVE STAFF] - 07/18/19 2:30 am


NIKKI RIZZO [Primary Care Provider] - 08/05/19 10:45 am


Forms:  Discharge Instructions

## 2021-12-27 ENCOUNTER — HOSPITAL ENCOUNTER (EMERGENCY)
Dept: HOSPITAL 33 - ED | Age: 76
Discharge: HOME | End: 2021-12-27
Payer: MEDICARE

## 2021-12-27 VITALS — HEART RATE: 62 BPM | OXYGEN SATURATION: 95 % | DIASTOLIC BLOOD PRESSURE: 69 MMHG | SYSTOLIC BLOOD PRESSURE: 131 MMHG

## 2021-12-27 DIAGNOSIS — I10: ICD-10-CM

## 2021-12-27 DIAGNOSIS — I12.9: ICD-10-CM

## 2021-12-27 DIAGNOSIS — E11.22: ICD-10-CM

## 2021-12-27 DIAGNOSIS — R79.1: ICD-10-CM

## 2021-12-27 DIAGNOSIS — E78.5: ICD-10-CM

## 2021-12-27 DIAGNOSIS — R07.9: Primary | ICD-10-CM

## 2021-12-27 DIAGNOSIS — K21.9: ICD-10-CM

## 2021-12-27 DIAGNOSIS — N18.9: ICD-10-CM

## 2021-12-27 DIAGNOSIS — Z79.84: ICD-10-CM

## 2021-12-27 LAB
ALBUMIN SERPL-MCNC: 3.8 G/DL (ref 3.5–5)
ALP SERPL-CCNC: 105 U/L (ref 38–126)
ALT SERPL-CCNC: 40 U/L (ref 0–35)
ANION GAP SERPL CALC-SCNC: 10.6 MEQ/L (ref 5–15)
AST SERPL QL: 33 U/L (ref 14–36)
BASOPHILS # BLD AUTO: 0.03 10*3/UL (ref 0–0.4)
BASOPHILS NFR BLD AUTO: 0.5 % (ref 0–0.4)
BILIRUB BLD-MCNC: 0.4 MG/DL (ref 0.2–1.3)
BNP SERPL-MCNC: 238 PG/ML (ref 0–1800)
BUN SERPL-MCNC: 20 MG/DL (ref 7–17)
CALCIUM SPEC-MCNC: 9.3 MG/DL (ref 8.4–10.2)
CHLORIDE SERPL-SCNC: 105 MMOL/L (ref 98–107)
CO2 SERPL-SCNC: 25 MMOL/L (ref 22–30)
CREAT SERPL-MCNC: 1.52 MG/DL (ref 0.52–1.04)
D DIMER BLD IA.RAPID-MCNC: 3940 NG/ML (ref 215–500)
EOSINOPHIL # BLD AUTO: 0.25 10*3/UL (ref 0–0.5)
GFR SERPLBLD BASED ON 1.73 SQ M-ARVRAT: 35.4 ML/MIN
GLUCOSE SERPL-MCNC: 112 MG/DL (ref 74–106)
HCT VFR BLD AUTO: 39.8 % (ref 35–47)
HGB BLD-MCNC: 12.7 GM/DL (ref 12–16)
INR PPP: 1.01 (ref 0.8–3)
LYMPHOCYTES # SPEC AUTO: 1.59 10*3/UL (ref 1–4.6)
MCH RBC QN AUTO: 29.3 PG (ref 26–32)
MCHC RBC AUTO-ENTMCNC: 31.9 G/DL (ref 32–36)
MONOCYTES # BLD AUTO: 0.52 10*3/UL (ref 0–1.3)
PLATELET # BLD AUTO: 237 K/MM3 (ref 150–450)
POTASSIUM SERPLBLD-SCNC: 4.2 MMOL/L (ref 3.5–5.1)
PROT SERPL-MCNC: 6.2 G/DL (ref 6.3–8.2)
PROTHROMBIN TIME: 11.9 SECONDS (ref 9.4–12.5)
RBC # BLD AUTO: 4.33 M/MM3 (ref 4.1–5.4)
SODIUM SERPL-SCNC: 136 MMOL/L (ref 137–145)
WBC # BLD AUTO: 6.1 K/MM3 (ref 4–10.5)

## 2021-12-27 PROCEDURE — 85025 COMPLETE CBC W/AUTO DIFF WBC: CPT

## 2021-12-27 PROCEDURE — 99284 EMERGENCY DEPT VISIT MOD MDM: CPT

## 2021-12-27 PROCEDURE — 85610 PROTHROMBIN TIME: CPT

## 2021-12-27 PROCEDURE — 71045 X-RAY EXAM CHEST 1 VIEW: CPT

## 2021-12-27 PROCEDURE — 94760 N-INVAS EAR/PLS OXIMETRY 1: CPT

## 2021-12-27 PROCEDURE — 36000 PLACE NEEDLE IN VEIN: CPT

## 2021-12-27 PROCEDURE — 96372 THER/PROPH/DIAG INJ SC/IM: CPT

## 2021-12-27 PROCEDURE — 93041 RHYTHM ECG TRACING: CPT

## 2021-12-27 PROCEDURE — 93005 ELECTROCARDIOGRAM TRACING: CPT

## 2021-12-27 PROCEDURE — 36415 COLL VENOUS BLD VENIPUNCTURE: CPT

## 2021-12-27 PROCEDURE — 83880 ASSAY OF NATRIURETIC PEPTIDE: CPT

## 2021-12-27 PROCEDURE — 80053 COMPREHEN METABOLIC PANEL: CPT

## 2021-12-27 PROCEDURE — 85379 FIBRIN DEGRADATION QUANT: CPT

## 2021-12-27 PROCEDURE — 84484 ASSAY OF TROPONIN QUANT: CPT

## 2021-12-27 NOTE — ERPHSYRPT
- History of Present Illness


Time Seen by Provider: 12/27/21 20:31


Historian: patient


Physician History: 





This is a morbidly obese white female patient of Dr. Joshua Barrett and 

cardiologist Dr. Gaston as well as nephrologist Dr. Tamayo.  The patient presents

with what she is calling epigastric lower chest pain which is described as an 

ache or pressure that is mild and radiates around the right upper quadrant to 

her back.  She has no nausea vomiting or diarrhea.  Patient has a history of 

gastroesophageal reflux disease, elevated cholesterol, hypertension, 

non-insulin-dependent diabetes and migraine headaches.  In June 2021 patient had

a pacemaker placed.  Patient denies having a history of a myocardial infarction 

in the past.  She has no shortness of breath.  She has no abdominal pain.  She 

denies fever and she denies cough


Timing/Duration: today


Activities at Onset: none


Quality: aching


Location: epigastric


Chest Pain Radiation: back (Radiates right upper quadrant to the back.)


Severity of Pain-Max: mild


Severity of Pain-Current: mild


Modifying Factors: Improves With: nothing


Associated Symptoms: denies symptoms


Nitro Today/Relief: no nitro taken today


Aspirin Treatment Today: 81 mg x 4, provided by ED


Allergies/Adverse Reactions: 








Sulfa (Sulfonamide Antibiotics) Allergy (Verified 12/27/21 20:31)


   


varicella-zoster immune globulin (h [varicella-zoster immune glob] Adverse 

Reaction (Intermediate, Verified 12/27/21 20:31)


   


   rash and sore bones 





Home Medications: 








Magnesium Oxide 400 mg PO DAILY 06/13/19 [History]


Solifenacin Succinate 10 mg PO DAILY 06/13/19 [History]


Spironolactone 25 mg PO DAILY 06/13/19 [History]


PANTOPRAZOLE 40 mg Tablet*** [Protonix 40MG Tablet***] 40 mg PO QAM 07/09/19 

[History]


Atorvastatin Calcium 20 mg PO DAILY 12/27/21 [History]


Metformin HCl 500 mg*** [Glucophage 500 MG***] 500 mg PO DAILY 12/27/21 

[History]





Hx Tetanus, Diphtheria Vaccination/Date Given: Yes


Hx Influenza Vaccination/Date Given: Yes


Hx Pneumococcal Vaccination/Date Given: Yes





Travel Risk





- International Travel


Have you traveled outside of the country in past 3 weeks: No





- Coronavirus Screening


Are you exhibiting any of the following symptoms?: No


Close contact with a COVID-19 positive Pt in past 14-21 Days: No





- Review of Systems


Constitutional: No Symptoms


Eyes: No Symptoms


Ears, Nose, & Throat: No Symptoms, Throat Swelling


Cardiac: Chest Pain


Abdominal/Gastrointestinal: No Symptoms


Genitourinary Symptoms: Incontinence


Musculoskeletal: No Symptoms


Skin: No Symptoms


Neurological: No Symptoms


Psychological: No Symptoms


Endocrine: No Symptoms


Hematologic/Lymphatic: No Symptoms


Immunological/Allergic: No Symptoms


All Other Systems: Reviewed and Negative





- Past Medical History


Pertinent Past Medical History: Yes


Neurological History: Migraines


ENT History: No Pertinent History


Cardiac History: High Cholesterol, Hypertension


Respiratory History: Bronchitis, Other


Endocrine Medical History: No Pertinent History


Musculoskeletal History: No Pertinent History


GI Medical History: GERD, Gallbladder Disease


 History: Renal Disease


Psycho-Social History: No Pertinent History


Female Reproductive Disorders: Fibroids, Other





- Past Surgical History


Past Surgical History: Yes


Neuro Surgical History: No Pertinent History


Cardiac: No Pertinent History


Respiratory: No Pertinent History


Gastrointestinal: Cholecystectomy, Colon Resection, Hernia Repair


Genitourinary: No Pertinent History


Musculoskeletal: No Pertinent History


Female Surgical History: Hysterectomy


Other Surgical History: TRANSVERSE COLECTOMY 7/3/19





- Social History


Smoking Status: Never smoker


Exposure to second hand smoke: No


Drug Use: none


Patient Lives Alone: Yes





- Nursing Vital Signs


Nursing Vital Signs: 


                               Initial Vital Signs











Temperature  98.2 F   12/27/21 20:30


 


Pulse Rate  74   12/27/21 20:30


 


Respiratory Rate  18   12/27/21 20:30


 


Blood Pressure  167/79   12/27/21 20:30


 


O2 Sat by Pulse Oximetry  96   12/27/21 20:30








                                   Pain Scale











Pain Intensity                 2

















- Physical Exam


General Appearance: no apparent distress, alert, anxiety, obese


Eye Exam: PERRL/EOMI, eyes nml inspection


Ears, Nose, Throat Exam: normal ENT inspection, moist mucous membranes


Neck Exam: normal inspection, non-tender, supple, full range of motion


Respiratory Exam: normal breath sounds, chest tenderness (Mild xiphoid process 

epigastric), lungs clear, airway intact, No respiratory distress


Cardiovascular Exam: normal heart sounds, normal peripheral pulses, other 

(Atrial paced rhythm)


Pelvic Exam: not done


Rectal Exam: not done


Back Exam: normal inspection, normal range of motion, No CVA tenderness, No 

vertebral tenderness


Extremity Exam: normal inspection, normal range of motion, pelvis stable


Neurologic Exam: alert, oriented x 3, cooperative, CNs II-XII nml as tested, 

normal mood/affect, nml cerebellar function, nml station & gait, sensation nml


Skin Exam: normal color, warm, dry


Lymphatic Exam: No adenopathy


**SpO2 Interpretation**: normal


O2 Delivery: Room Air





- Course


Nursing assessment & vital signs reviewed: Yes


EKG Interpreted by Me: RATE ([id number]), LAFB, Other (There are no acute ischemic 

changes on today's EKG.  Comparison EKG was performed on 6/29/2019.  This was 

prior to the pacemaker placement.  At that time she also had some anterior lead 

nonischemic changes that is chronic that are persistent today.)


Ordered Tests: 


                               Active Orders 24 hr











 Category Date Time Status


 


 Cardiac Monitor STAT Care  12/27/21 20:45 Active


 


 EKG-ER Only STAT Care  12/27/21 20:43 Active


 


 IV Insertion STAT Care  12/27/21 20:43 Active


 


 Pulse Oximetry (ED) STAT Care  12/27/21 20:43 Active


 


 CHEST 1 VIEW (PORTABLE) Stat Exams  12/27/21 20:43 Taken


 


 CBC W DIFF Stat Lab  12/27/21 21:30 Completed


 


 CMP Stat Lab  12/27/21 21:30 Completed


 


 D-DIMER QUANTITATIVE Stat Lab  12/27/21 21:30 Completed


 


 NT PRO BNP Stat Lab  12/27/21 21:30 Completed


 


 PROTIME WITH INR Stat Lab  12/27/21 21:30 Completed


 


 TROPONIN Q3H Lab  12/27/21 21:30 Completed


 


 TROPONIN Q3H Lab  12/27/21 23:45 Ordered


 


 TROPONIN Q3H Lab  12/28/21 02:45 Ordered


 


 TROPONIN Q3H Lab  12/28/21 05:45 Ordered


 


 TROPONIN Q3H Lab  12/28/21 08:45 Ordered








Medication Summary














Discontinued Medications














Generic Name Dose Route Start Last Admin





  Trade Name Freq  PRN Reason Stop Dose Admin


 


Aspirin  324 mg  12/27/21 20:43  12/27/21 20:46





  Aspirin 81 Mg Tab.Chew  PO  12/27/21 20:44  324 mg





  STAT ONE   Administration











Lab/Rad Data: 


                           Laboratory Result Diagrams





                                 12/27/21 21:30 





                                 12/27/21 21:30 





                               Laboratory Results











  12/27/21 12/27/21 12/27/21 Range/Units





  21:30 21:30 21:30 


 


WBC     (4.0-10.5)  K/mm3


 


RBC     (4.1-5.4)  M/mm3


 


Hgb     (12.0-16.0)  gm/dl


 


Hct     (35-47)  %


 


MCV     ()  fl


 


MCH     (26-32)  pg


 


MCHC     (32-36)  g/dl


 


RDW     (11.5-14.0)  %


 


Plt Count     (150-450)  K/mm3


 


MPV     (7.5-11.0)  fl


 


Gran %     (36.0-66.0)  %


 


Eos # (Auto)     (0-0.5)  


 


Absolute Lymphs (auto)     (1.0-4.6)  


 


Absolute Monos (auto)     (0.0-1.3)  


 


Lymphocytes %     (24.0-44.0)  %


 


Monocytes %     (0.0-12.0)  %


 


Eosinophils %     (0.00-5.0)  %


 


Basophils %     (0.0-0.4)  %


 


Absolute Granulocytes     (1.4-6.9)  


 


Basophils #     (0-0.4)  


 


PT   11.9   (9.4-12.5)  SECONDS


 


INR   1.01   (0.8-3.0)  


 


D-Dimer   3940 H*   (215-500)  ng/mL


 


Sodium    136 L  (137-145)  mmol/L


 


Potassium    4.2  (3.5-5.1)  mmol/L


 


Chloride    105  ()  mmol/L


 


Carbon Dioxide    25  (22-30)  mmol/L


 


Anion Gap    10.6  (5-15)  MEQ/L


 


BUN    20 H  (7-17)  mg/dL


 


Creatinine    1.52 H  (0.52-1.04)  mg/dL


 


Estimated GFR    35.4  ML/MIN


 


Glucose    112 H  ()  mg/dL


 


Calcium    9.3  (8.4-10.2)  mg/dL


 


Total Bilirubin    0.40  (0.2-1.3)  mg/dL


 


AST    33  (14-36)  U/L


 


ALT    40 H  (0-35)  U/L


 


Alkaline Phosphatase    105  ()  U/L


 


Troponin I  < 0.012    (0.000-0.034)  ng/mL


 


NT-Pro-B Natriuret Pep    238  (0-1800)  pg/mL


 


Serum Total Protein    6.2 L  (6.3-8.2)  g/dL


 


Albumin    3.8  (3.5-5.0)  g/dL














  12/27/21 Range/Units





  21:30 


 


WBC  6.1  (4.0-10.5)  K/mm3


 


RBC  4.33  (4.1-5.4)  M/mm3


 


Hgb  12.7  (12.0-16.0)  gm/dl


 


Hct  39.8  (35-47)  %


 


MCV  91.9  ()  fl


 


MCH  29.3  (26-32)  pg


 


MCHC  31.9 L  (32-36)  g/dl


 


RDW  12.8  (11.5-14.0)  %


 


Plt Count  237  (150-450)  K/mm3


 


MPV  9.2  (7.5-11.0)  fl


 


Gran %  61.0  (36.0-66.0)  %


 


Eos # (Auto)  0.25  (0-0.5)  


 


Absolute Lymphs (auto)  1.59  (1.0-4.6)  


 


Absolute Monos (auto)  0.52  (0.0-1.3)  


 


Lymphocytes %  25.9  (24.0-44.0)  %


 


Monocytes %  8.5  (0.0-12.0)  %


 


Eosinophils %  4.1  (0.00-5.0)  %


 


Basophils %  0.5  (0.0-0.4)  %


 


Absolute Granulocytes  3.75  (1.4-6.9)  


 


Basophils #  0.03  (0-0.4)  


 


PT   (9.4-12.5)  SECONDS


 


INR   (0.8-3.0)  


 


D-Dimer   (215-500)  ng/mL


 


Sodium   (137-145)  mmol/L


 


Potassium   (3.5-5.1)  mmol/L


 


Chloride   ()  mmol/L


 


Carbon Dioxide   (22-30)  mmol/L


 


Anion Gap   (5-15)  MEQ/L


 


BUN   (7-17)  mg/dL


 


Creatinine   (0.52-1.04)  mg/dL


 


Estimated GFR   ML/MIN


 


Glucose   ()  mg/dL


 


Calcium   (8.4-10.2)  mg/dL


 


Total Bilirubin   (0.2-1.3)  mg/dL


 


AST   (14-36)  U/L


 


ALT   (0-35)  U/L


 


Alkaline Phosphatase   ()  U/L


 


Troponin I   (0.000-0.034)  ng/mL


 


NT-Pro-B Natriuret Pep   (0-1800)  pg/mL


 


Serum Total Protein   (6.3-8.2)  g/dL


 


Albumin   (3.5-5.0)  g/dL














- Progress


Progress: improved, re-examined


Air Movement: good


Progress Note: 





12/27/21 22:17


Medical decision making: This patient has chronic marginal at best renal 

function.  I do not feel that we will be able to increase her GFR to 50 with IV 

hydration but we will attempt to do that.  I did speak with Dr. Joshua Barrett 

about bring her in the hospital and making arrangements for a nuclear medicine 

VQ scan.  However, radiology states that they were here today and nuclear 

medicine will not return to this hospital until Thursday.  We will contact [facility] or another local facility to see if we can make arrangements 

for brief transfer an outpatient VQ scan tomorrow.  In the meantime the plan w

ill be to provide her with Lovenox 90 mg subcu every 12 hours.


12/27/21 22:41


Medical decision making I contacted Dr. Joshua Barrett again.  We do not have 

nuclear medicine available till 12/30/2021.  Therefore, the patient will receive

 Lovenox 90 mg subcutaneously now.  She will then call Dr. Joshua Barrett's 

office first thing in the morning at 830.  Dr. Joshua Barrett is going to make 

arrangements for outpatient V/Q scan and Dr. Joshua Barrett stated she will 

write prescriptions for more subcutaneous Lovenox.


Blood Culture(s) Obtained: No


Antibiotics given: No


Discussed with : Brandon


Counseled pt/family regarding: lab results, diagnosis, need for follow-up, rad 

results





- Departure


Departure Disposition: Home


Clinical Impression: 


 Chest pain, Elevated d-dimer, Renal insufficiency





Condition: Stable


Critical Care Time: No


Referrals: 


NIKKI LAL [Primary Care Provider] - Follow up/PCP as directed


Additional Instructions: 


Call Dr. Joshua Barrett's office tomorrow morning at 830.  Dr. Joshua Barrett 

is going to write a prescription for more blood thinning medication (Lovenox).  

In addition, she is going to make arrangements for ear outpatient nuclear 

medicine test.

## 2021-12-28 NOTE — XRAY
Indication: Chest pain.



Comparison: July 23, 2021.



Portable chest less inflated with new moderate left base

infiltrate/atelectasis/effusion and new minimal right base subsegmental

atelectasis/scarring.  Heart not enlarged with new left dual-lead pacemaker.

Bony thorax intact again with mild osteopenia and degenerative changes.

## 2022-09-20 ENCOUNTER — HOSPITAL ENCOUNTER (OUTPATIENT)
Dept: HOSPITAL 33 - SDC | Age: 77
Discharge: HOME | End: 2022-09-20
Attending: OPHTHALMOLOGY
Payer: MEDICARE

## 2022-09-20 VITALS — SYSTOLIC BLOOD PRESSURE: 161 MMHG | HEART RATE: 63 BPM | DIASTOLIC BLOOD PRESSURE: 69 MMHG

## 2022-09-20 VITALS — OXYGEN SATURATION: 94 %

## 2022-09-20 DIAGNOSIS — H25.812: Primary | ICD-10-CM

## 2022-09-20 PROCEDURE — 99100 ANES PT EXTEME AGE<1 YR&>70: CPT

## 2022-09-20 PROCEDURE — C1780 LENS, INTRAOCULAR (NEW TECH): HCPCS

## 2022-11-15 ENCOUNTER — HOSPITAL ENCOUNTER (OUTPATIENT)
Dept: HOSPITAL 33 - SDC | Age: 77
Discharge: HOME | End: 2022-11-15
Attending: OPHTHALMOLOGY
Payer: MEDICARE

## 2022-11-15 VITALS — SYSTOLIC BLOOD PRESSURE: 112 MMHG | DIASTOLIC BLOOD PRESSURE: 89 MMHG | HEART RATE: 60 BPM | OXYGEN SATURATION: 94 %

## 2022-11-15 DIAGNOSIS — E11.9: ICD-10-CM

## 2022-11-15 DIAGNOSIS — H25.811: Primary | ICD-10-CM

## 2022-11-15 PROCEDURE — 99100 ANES PT EXTEME AGE<1 YR&>70: CPT

## 2022-11-15 PROCEDURE — 82947 ASSAY GLUCOSE BLOOD QUANT: CPT

## 2022-11-15 PROCEDURE — C1780 LENS, INTRAOCULAR (NEW TECH): HCPCS

## 2023-02-22 ENCOUNTER — HOSPITAL ENCOUNTER (EMERGENCY)
Dept: HOSPITAL 33 - ED | Age: 78
Discharge: HOME | End: 2023-02-22
Payer: MEDICARE

## 2023-02-22 VITALS — HEART RATE: 61 BPM | DIASTOLIC BLOOD PRESSURE: 67 MMHG | SYSTOLIC BLOOD PRESSURE: 147 MMHG

## 2023-02-22 VITALS — OXYGEN SATURATION: 97 %

## 2023-02-22 DIAGNOSIS — E78.5: ICD-10-CM

## 2023-02-22 DIAGNOSIS — I12.9: Primary | ICD-10-CM

## 2023-02-22 DIAGNOSIS — Z79.899: ICD-10-CM

## 2023-02-22 DIAGNOSIS — N18.4: ICD-10-CM

## 2023-02-22 LAB
ALBUMIN SERPL-MCNC: 4.4 G/DL (ref 3.5–5)
ALP SERPL-CCNC: 97 U/L (ref 38–126)
ALT SERPL-CCNC: 43 U/L (ref 0–35)
ANION GAP SERPL CALC-SCNC: 13.7 MEQ/L (ref 5–15)
AST SERPL QL: 32 U/L (ref 14–36)
BASOPHILS # BLD AUTO: 0.03 X10^3/UL (ref 0–0.4)
BASOPHILS NFR BLD AUTO: 0.5 % (ref 0–0.4)
BILIRUB BLD-MCNC: 0.5 MG/DL (ref 0.2–1.3)
BUN SERPL-MCNC: 26 MG/DL (ref 7–17)
CALCIUM SPEC-MCNC: 9.8 MG/DL (ref 8.4–10.2)
CHLORIDE SERPL-SCNC: 107 MMOL/L (ref 98–107)
CO2 SERPL-SCNC: 24 MMOL/L (ref 22–30)
CREAT SERPL-MCNC: 1.35 MG/DL (ref 0.52–1.04)
EOSINOPHIL # BLD AUTO: 0.12 X10^3/UL (ref 0–0.5)
GFR SERPLBLD BASED ON 1.73 SQ M-ARVRAT: 40.5 ML/MIN
GLUCOSE SERPL-MCNC: 127 MG/DL (ref 74–106)
HCT VFR BLD AUTO: 42.3 % (ref 35–47)
HGB BLD-MCNC: 13.8 G/DL (ref 12–16)
IMM GRANULOCYTES # BLD: 0.01 X10^3U/L (ref 0–0.03)
IMM GRANULOCYTES NFR BLD: 0.2 % (ref 0–0.4)
LYMPHOCYTES # SPEC AUTO: 1.09 X10^3/UL (ref 1–4.6)
MCH RBC QN AUTO: 29.1 PG (ref 26–32)
MCHC RBC AUTO-ENTMCNC: 32.6 G/DL (ref 32–36)
MONOCYTES # BLD AUTO: 0.44 X10^3/UL (ref 0–1.3)
NRBC # BLD AUTO: 0 X10^3U/L (ref 0–0.01)
NRBC BLD AUTO-RTO: 0 % (ref 0–0.1)
NT-PROBNP SERPL-MCNC: 81.5 PG/ML (ref ?–300)
PLATELET # BLD AUTO: 213 X10^3/UL (ref 150–450)
POTASSIUM SERPLBLD-SCNC: 4.5 MMOL/L (ref 3.5–5.1)
PROT SERPL-MCNC: 7.2 G/DL (ref 6.3–8.2)
RBC # BLD AUTO: 4.75 X10^6/UL (ref 4.1–5.4)
SODIUM SERPL-SCNC: 140 MMOL/L (ref 137–145)
WBC # BLD AUTO: 5.5 X10^3/UL (ref 4–10.5)

## 2023-02-22 PROCEDURE — 80053 COMPREHEN METABOLIC PANEL: CPT

## 2023-02-22 PROCEDURE — 85025 COMPLETE CBC W/AUTO DIFF WBC: CPT

## 2023-02-22 PROCEDURE — 93005 ELECTROCARDIOGRAM TRACING: CPT

## 2023-02-22 PROCEDURE — 36415 COLL VENOUS BLD VENIPUNCTURE: CPT

## 2023-02-22 PROCEDURE — 84484 ASSAY OF TROPONIN QUANT: CPT

## 2023-02-22 PROCEDURE — 71046 X-RAY EXAM CHEST 2 VIEWS: CPT

## 2023-02-22 PROCEDURE — 99284 EMERGENCY DEPT VISIT MOD MDM: CPT

## 2023-02-22 PROCEDURE — 83880 ASSAY OF NATRIURETIC PEPTIDE: CPT

## 2023-02-22 PROCEDURE — 36000 PLACE NEEDLE IN VEIN: CPT

## 2023-02-22 RX ADMIN — ASPIRIN ONE MG: 81 TABLET, CHEWABLE ORAL at 10:26

## 2023-02-22 NOTE — XRAY
Indication: Short of breath.  Hypertension.



Comparison: December 30, 2021



PA/lateral chest again hyperinflated and clear with a few incidental calcified

granulomas.  Heart not enlarged again with left dual-lead pacemaker.  Bony

thorax intact again with osteopenia, mild degenerative changes, and minimal

levoscoliosis.



Impression: Continued nonacute hyperinflated chest with chronic features.

## 2023-02-22 NOTE — ERPHSYRPT
- History of Present Illness


Time Seen by Provider: 02/22/23 09:20


Source: patient


Exam Limitations: no limitations


Patient Subjective Stated Complaint: Pt states "I took my blood pressure was up 

this morning and I took it 3 times and it stayed elevated so I called my DrGrey and

they said to come here."


Triage Nursing Assessment: Pt presented alert and oriented x 3, skin pwd. Pt 

ambulates with an upright steady gait, able to speak in clear full sentences. PT

in no apparent respiratory distress.


Physician History: 





Patient here with asymptomatic hypertension.  Patient states that she was at 

home today.  Took her blood pressure several times.  It is all elevated.  She 

had no symptoms.  No chest pain, shortness of breath, nausea, vomiting, other 

atypical symptoms of cardiac disease.  Patient states that she does have end-

stage kidney disease.  However, she is not on any dialysis.  Patient states that

she does not want to go on dialysis.  Patient called her PCP about her elevated 

blood pressures.  And they sent her here.


Timing/Duration: today


Severity: moderate


Allergies/Adverse Reactions: 








Sulfa (Sulfonamide Antibiotics) Allergy (Verified 11/08/22 12:13)


   


varicella-zoster immune globulin (h [varicella-zoster immune glob] Adverse 

Reaction (Intermediate, Verified 11/08/22 12:13)


   


   rash and sore bones 





Home Medications: 








Solifenacin Succinate 10 mg PO DAILY 06/13/19 [History]


Acetaminophen 500 mg*** [Tylenol Extra Strength 500 mg***] 500 mg PO DAILY PRN 

PRN 09/08/22 [History]


Cetirizine HCl [Allergy Relief] 10 mg PO DAILY 09/08/22 [History]


Ergocalciferol (Vitamin D2) [Vitamin D2] 1 tab PO UD 09/08/22 [History]


Famotidine 20 mg*** [Pepcid 20 MG***] 20 mg PO DAILY PRN PRN 09/08/22 [History]


Folic Acid/Vit B Complex and C [Super B Complex-Vit C Caplet] 1 tab PO DAILY 

09/08/22 [History]


Lisinopril 5 mg*** [Zestril 5 MG***] 2.5 mg PO DAILY 09/08/22 [History]


Alpha Lipoic Acid 100 mg PO UD 11/08/22 [History]





Hx Tetanus, Diphtheria Vaccination/Date Given: Yes


Hx Influenza Vaccination/Date Given: Yes


Hx Pneumococcal Vaccination/Date Given: Yes


Immunizations Up to Date: Yes





Travel Risk





- International Travel


Have you traveled outside of the country in past 3 weeks: No





- Coronavirus Screening


Are you exhibiting any of the following symptoms?: No





- Vaccine Status


Have you recieved a Covid-19 vaccination: Yes


: Moderna





- Vaccination Dates


Date of 2cond Vaccination (if applicable): 3/8/2021





- Review of Systems


Constitutional: No Fever, No Chills


Eyes: No Symptoms


Ears, Nose, & Throat: No Symptoms


Respiratory: No Cough, No Dyspnea


Cardiac: No Chest Pain, No Edema, No Syncope


Abdominal/Gastrointestinal: No Abdominal Pain, No Nausea, No Vomiting, No 

Diarrhea


Genitourinary Symptoms: No Dysuria


Musculoskeletal: No Back Pain, No Neck Pain


Skin: No Rash


Neurological: No Dizziness, No Focal Weakness, No Sensory Changes


Psychological: No Symptoms


Endocrine: No Symptoms


All Other Systems: Reviewed and Negative





- Past Medical History


Pertinent Past Medical History: Yes


Neurological History: Migraines


ENT History: Cataracts


Cardiac History: High Cholesterol, Hypertension, Other


Respiratory History: Bronchitis, Other


Endocrine Medical History: No Pertinent History


Musculoskeletal History: No Pertinent History


GI Medical History: GERD, Gallbladder Disease


 History: Renal Disease


Psycho-Social History: No Pertinent History


Female Reproductive Disorders: Fibroids, Other


Other Medical History: Stage 4 renal failure





- Past Surgical History


Past Surgical History: Yes


Neuro Surgical History: No Pertinent History


Cardiac: No Pertinent History, Pacemaker


Respiratory: No Pertinent History


Gastrointestinal: Cholecystectomy, Colon Resection, Hernia Repair


Genitourinary: No Pertinent History


Musculoskeletal: No Pertinent History


Female Surgical History: Hysterectomy


Other Surgical History: TRANSVERSE COLECTOMY 7/3/19,pacemaker





- Social History


Smoking Status: Never smoker


Exposure to second hand smoke: Yes


Drug Use: none


Patient Lives Alone: Yes





- Nursing Vital Signs


Nursing Vital Signs: 


                               Initial Vital Signs











Temperature  97.5 F   02/22/23 09:20


 


Pulse Rate  84   02/22/23 09:20


 


Respiratory Rate  20   02/22/23 09:20


 


Blood Pressure  152/95   02/22/23 09:20


 


O2 Sat by Pulse Oximetry  97   02/22/23 09:20








                                   Pain Scale











Pain Intensity                 5

















- Physical Exam


General Appearance: no apparent distress, alert


Eye Exam: PERRL/EOMI, eyes nml inspection


Ears, Nose, Throat Exam: normal ENT inspection, TMs normal, pharynx normal, 

moist mucous membranes


Neck Exam: normal inspection, non-tender, supple, full range of motion


Respiratory Exam: normal breath sounds, lungs clear, No respiratory distress


Cardiovascular Exam: regular rate/rhythm, normal heart sounds, normal peripheral

 pulses


Gastrointestinal/Abdomen Exam: soft, normal bowel sounds, No tenderness, No mass


Back Exam: normal inspection, normal range of motion, No CVA tenderness, No 

vertebral tenderness


Extremity Exam: normal inspection, normal range of motion, pelvis stable


Neurologic Exam: alert, oriented x 3, cooperative, normal mood/affect, nml 

cerebellar function, nml station & gait, sensation nml, No motor deficits


Skin Exam: normal color, warm, dry, No rash


Lymphatic Exam: No adenopathy


SpO2: 97





- Course


Nursing assessment & vital signs reviewed: Yes


EKG Interpreted by Me: Sinus Rhythm (No ischemic changes)


Ordered Tests: 


                               Active Orders 24 hr











 Category Date Time Status


 


 EKG-ER Only STAT Care  02/22/23 09:33 Active


 


 IV Insertion STAT Care  02/22/23 09:33 Active


 


 CHEST 2 VIEWS (PA AND LAT) Stat Exams  02/22/23 09:45 Completed


 


 CBC W DIFF Stat Lab  02/22/23 09:56 Completed


 


 CMP Stat Lab  02/22/23 09:56 Completed


 


 NT PRO BNPII Stat Lab  02/22/23 09:56 Completed


 


 TROPONIN Q4H Lab  02/22/23 09:56 Completed


 


 TROPONIN Q4H Lab  02/22/23 13:45 Ordered


 


 TROPONIN Q4H Lab  02/22/23 17:45 Ordered








Medication Summary














Discontinued Medications














Generic Name Dose Route Start Last Admin





  Trade Name Roberta  PRN Reason Stop Dose Admin


 


Aspirin  324 mg  02/22/23 09:33  02/22/23 10:26





  Aspirin 81 Mg Tab.Chew  PO  02/22/23 09:34  324 mg





  STAT ONE   Administration


 


Aspirin  Confirm  02/22/23 10:23 





  Aspirin 81 Mg Tab.Chew  Administered  02/22/23 10:24 





  Dose  





  324 mg  





  .ROUTE  





  .STK-MED ONE  











Lab/Rad Data: 


                           Laboratory Result Diagrams





                                 02/22/23 09:56 





                                 02/22/23 09:56 





                               Laboratory Results











  02/22/23 02/22/23 02/22/23 Range/Units





  09:56 09:56 09:56 


 


WBC    5.5  (4.0-10.5)  x10^3/uL


 


RBC    4.75  (4.1-5.4)  x10^6/uL


 


Hgb    13.8  (12.0-16.0)  g/dL


 


Hct    42.3  (35-47)  %


 


MCV    89.1  ()  fL


 


MCH    29.1  (26-32)  pg


 


MCHC    32.6  (32-36)  g/dL


 


RDW    13.0  (11.5-14.0)  %


 


Plt Count    213  (150-450)  x10^3/uL


 


MPV    9.3  (7.5-11.0)  fL


 


Gran %    69.3 H  (36.0-66.0)  %


 


Immature Gran % (Auto)    0.2  (0.00-0.4)  %


 


Nucleat RBC Rel Count    0.0  (0.00-0.1)  %


 


Eos # (Auto)    0.12  (0-0.5)  x10^3/uL


 


Immature Gran # (Auto)    0.01  (0.00-0.03)  x10^3u/L


 


Absolute Lymphs (auto)    1.09  (1.0-4.6)  x10^3/uL


 


Absolute Monos (auto)    0.44  (0.0-1.3)  x10^3/uL


 


Absolute Nucleated RBC    0.00  (0.00-0.01)  x10^3u/L


 


Lymphocytes %    19.8 L  (24.0-44.0)  %


 


Monocytes %    8.0  (0.0-12.0)  %


 


Eosinophils %    2.2  (0.00-5.0)  %


 


Basophils %    0.5  (0.0-0.4)  %


 


Absolute Granulocytes    3.81  (1.4-6.9)  x10^3/uL


 


Basophils #    0.03  (0-0.4)  x10^3/uL


 


Sodium   140   (137-145)  mmol/L


 


Potassium   4.5   (3.5-5.1)  mmol/L


 


Chloride   107   ()  mmol/L


 


Carbon Dioxide   24   (22-30)  mmol/L


 


Anion Gap   13.7   (5-15)  MEQ/L


 


BUN   26 H   (7-17)  mg/dL


 


Creatinine   1.35 H   (0.52-1.04)  mg/dL


 


Estimated GFR   40.5   ML/MIN


 


Glucose   127 H   ()  mg/dL


 


Calcium   9.8   (8.4-10.2)  mg/dL


 


Total Bilirubin   0.50   (0.2-1.3)  mg/dL


 


AST   32   (14-36)  U/L


 


ALT   43 H   (0-35)  U/L


 


Alkaline Phosphatase   97   ()  U/L


 


Troponin I  < 0.012    (0.000-0.034)  ng/mL


 


NT-Pro-B Natriuret Pep   81.5   (<300)  pg/mL


 


Serum Total Protein   7.2   (6.3-8.2)  g/dL


 


Albumin   4.4   (3.5-5.0)  g/dL














- Progress


Progress: improved


Progress Note: 





02/22/23 09:50


differential diagnosis includes: PNA, STEMI, NSTEMI, other infection,  

musculoskeletal pain, pneumothorax


- We&#39;ll obtain basic labs, fluids, EKG, troponin, chest x-ray


- I feel comfortable with one time negative troponin given symptoms have 

improved and started greater


then 6 hours ago.


- EKG shows no ST changes - my read. See full read below.


- O2 saturations consistently greater than 95%.


- CXR shows no pneumonia, pneumothorax - my read


- no other obvious lab abnormalities


02/22/23 11:30


Patient feels much improved.  Patient was observed here.  Patient's blood 

pressure came down without any intervention.  Most likely asymptomatic 

hypertension.  She will follow-up with her PCP for repeat blood pressure check 

tomorrow.  Patient will return here sooner for new or changing symptoms.


Discussed with : Nacho


Counseled pt/family regarding: lab results, diagnosis, need for follow-up, rad 

results





- Departure


Departure Disposition: Home


Clinical Impression: 


 Hypertension





Condition: Stable


Critical Care Time: No


Referrals: 


NIKKI LAL [Primary Care Provider] - Follow up/PCP as directed


Instructions:  Chronic Kidney Disease